# Patient Record
Sex: MALE | Race: WHITE | NOT HISPANIC OR LATINO | Employment: OTHER | URBAN - NONMETROPOLITAN AREA
[De-identification: names, ages, dates, MRNs, and addresses within clinical notes are randomized per-mention and may not be internally consistent; named-entity substitution may affect disease eponyms.]

---

## 2021-02-28 ENCOUNTER — APPOINTMENT (EMERGENCY)
Dept: CT IMAGING | Facility: HOSPITAL | Age: 71
DRG: 066 | End: 2021-02-28
Payer: MEDICARE

## 2021-02-28 ENCOUNTER — HOSPITAL ENCOUNTER (INPATIENT)
Facility: HOSPITAL | Age: 71
LOS: 1 days | Discharge: HOME | DRG: 066 | End: 2021-03-01
Attending: EMERGENCY MEDICINE | Admitting: FAMILY MEDICINE
Payer: MEDICARE

## 2021-02-28 ENCOUNTER — APPOINTMENT (EMERGENCY)
Dept: RADIOLOGY | Facility: HOSPITAL | Age: 71
DRG: 066 | End: 2021-02-28
Payer: MEDICARE

## 2021-02-28 DIAGNOSIS — Z72.0 TOBACCO ABUSE: ICD-10-CM

## 2021-02-28 DIAGNOSIS — I63.9 ACUTE CVA (CEREBROVASCULAR ACCIDENT) (HCC): ICD-10-CM

## 2021-02-28 DIAGNOSIS — I63.9 STROKE (HCC): Primary | ICD-10-CM

## 2021-02-28 DIAGNOSIS — R21 RASH: ICD-10-CM

## 2021-02-28 DIAGNOSIS — N40.0 BPH (BENIGN PROSTATIC HYPERPLASIA): ICD-10-CM

## 2021-02-28 DIAGNOSIS — I10 HTN (HYPERTENSION): ICD-10-CM

## 2021-02-28 DIAGNOSIS — I71.2 ASCENDING AORTIC ANEURYSM (HCC): ICD-10-CM

## 2021-02-28 PROBLEM — I71.21 ASCENDING AORTIC ANEURYSM: Status: ACTIVE | Noted: 2021-02-28

## 2021-02-28 PROBLEM — E03.9 HYPOTHYROIDISM: Status: ACTIVE | Noted: 2021-02-28

## 2021-02-28 LAB
ALBUMIN SERPL BCP-MCNC: 3.9 G/DL (ref 3.5–5)
ALP SERPL-CCNC: 92 U/L (ref 46–116)
ALT SERPL W P-5'-P-CCNC: 43 U/L (ref 12–78)
AMPHETAMINES SERPL QL SCN: NEGATIVE
ANION GAP SERPL CALCULATED.3IONS-SCNC: 9 MMOL/L (ref 4–13)
APTT PPP: 34 SECONDS (ref 23–37)
AST SERPL W P-5'-P-CCNC: 23 U/L (ref 5–45)
ATRIAL RATE: 68 BPM
BACTERIA UR QL AUTO: NORMAL /HPF
BARBITURATES UR QL: NEGATIVE
BENZODIAZ UR QL: NEGATIVE
BILIRUB DIRECT SERPL-MCNC: 0.19 MG/DL (ref 0–0.2)
BILIRUB SERPL-MCNC: 0.93 MG/DL (ref 0.2–1)
BILIRUB UR QL STRIP: NEGATIVE
BUN SERPL-MCNC: 17 MG/DL (ref 5–25)
CALCIUM SERPL-MCNC: 8.4 MG/DL (ref 8.3–10.1)
CHLORIDE SERPL-SCNC: 103 MMOL/L (ref 100–108)
CK SERPL-CCNC: 110 U/L (ref 39–308)
CLARITY UR: CLEAR
CO2 SERPL-SCNC: 28 MMOL/L (ref 21–32)
COCAINE UR QL: NEGATIVE
COLOR UR: YELLOW
CREAT SERPL-MCNC: 1.19 MG/DL (ref 0.6–1.3)
ERYTHROCYTE [DISTWIDTH] IN BLOOD BY AUTOMATED COUNT: 12.5 % (ref 11.6–15.1)
EST. AVERAGE GLUCOSE BLD GHB EST-MCNC: 100 MG/DL
ETHANOL SERPL-MCNC: <3 MG/DL (ref 0–3)
FLUAV RNA RESP QL NAA+PROBE: NEGATIVE
FLUBV RNA RESP QL NAA+PROBE: NEGATIVE
GFR SERPL CREATININE-BSD FRML MDRD: 61 ML/MIN/1.73SQ M
GLUCOSE SERPL-MCNC: 84 MG/DL (ref 65–140)
GLUCOSE SERPL-MCNC: 91 MG/DL (ref 65–140)
GLUCOSE UR STRIP-MCNC: NEGATIVE MG/DL
HBA1C MFR BLD: 5.1 %
HCT VFR BLD AUTO: 48.5 % (ref 36.5–49.3)
HGB BLD-MCNC: 16.8 G/DL (ref 12–17)
HGB UR QL STRIP.AUTO: ABNORMAL
INR PPP: 1.02 (ref 0.84–1.19)
KETONES UR STRIP-MCNC: NEGATIVE MG/DL
LEUKOCYTE ESTERASE UR QL STRIP: NEGATIVE
MCH RBC QN AUTO: 31.6 PG (ref 26.8–34.3)
MCHC RBC AUTO-ENTMCNC: 34.6 G/DL (ref 31.4–37.4)
MCV RBC AUTO: 91 FL (ref 82–98)
METHADONE UR QL: NEGATIVE
NITRITE UR QL STRIP: NEGATIVE
NON-SQ EPI CELLS URNS QL MICRO: NORMAL /HPF
OPIATES UR QL SCN: NEGATIVE
OXYCODONE+OXYMORPHONE UR QL SCN: NEGATIVE
P AXIS: 55 DEGREES
PCP UR QL: NEGATIVE
PH UR STRIP.AUTO: 5 [PH]
PLATELET # BLD AUTO: 316 THOUSANDS/UL (ref 149–390)
PMV BLD AUTO: 9.4 FL (ref 8.9–12.7)
POTASSIUM SERPL-SCNC: 3.8 MMOL/L (ref 3.5–5.3)
PR INTERVAL: 194 MS
PROT SERPL-MCNC: 7.5 G/DL (ref 6.4–8.2)
PROT UR STRIP-MCNC: NEGATIVE MG/DL
PROTHROMBIN TIME: 13.2 SECONDS (ref 11.6–14.5)
QRS AXIS: -49 DEGREES
QRSD INTERVAL: 100 MS
QT INTERVAL: 420 MS
QTC INTERVAL: 446 MS
RBC # BLD AUTO: 5.32 MILLION/UL (ref 3.88–5.62)
RBC #/AREA URNS AUTO: NORMAL /HPF
RSV RNA RESP QL NAA+PROBE: NEGATIVE
SARS-COV-2 RNA RESP QL NAA+PROBE: NEGATIVE
SODIUM SERPL-SCNC: 140 MMOL/L (ref 136–145)
SP GR UR STRIP.AUTO: 1.01 (ref 1–1.03)
T WAVE AXIS: 31 DEGREES
THC UR QL: POSITIVE
TROPONIN I SERPL-MCNC: <0.02 NG/ML
TSH SERPL DL<=0.05 MIU/L-ACNC: 2.59 UIU/ML (ref 0.36–3.74)
TSH SERPL DL<=0.05 MIU/L-ACNC: 2.64 UIU/ML (ref 0.36–3.74)
UROBILINOGEN UR QL STRIP.AUTO: 0.2 E.U./DL
VENTRICULAR RATE: 68 BPM
VIT B12 SERPL-MCNC: 477 PG/ML (ref 100–900)
WBC # BLD AUTO: 10.35 THOUSAND/UL (ref 4.31–10.16)
WBC #/AREA URNS AUTO: NORMAL /HPF

## 2021-02-28 PROCEDURE — 81001 URINALYSIS AUTO W/SCOPE: CPT | Performed by: EMERGENCY MEDICINE

## 2021-02-28 PROCEDURE — 99285 EMERGENCY DEPT VISIT HI MDM: CPT

## 2021-02-28 PROCEDURE — 0241U HB NFCT DS VIR RESP RNA 4 TRGT: CPT | Performed by: EMERGENCY MEDICINE

## 2021-02-28 PROCEDURE — 82077 ASSAY SPEC XCP UR&BREATH IA: CPT | Performed by: EMERGENCY MEDICINE

## 2021-02-28 PROCEDURE — 80076 HEPATIC FUNCTION PANEL: CPT | Performed by: EMERGENCY MEDICINE

## 2021-02-28 PROCEDURE — 84484 ASSAY OF TROPONIN QUANT: CPT | Performed by: EMERGENCY MEDICINE

## 2021-02-28 PROCEDURE — 93005 ELECTROCARDIOGRAM TRACING: CPT

## 2021-02-28 PROCEDURE — G0427 INPT/ED TELECONSULT70: HCPCS | Performed by: PSYCHIATRY & NEUROLOGY

## 2021-02-28 PROCEDURE — 71045 X-RAY EXAM CHEST 1 VIEW: CPT

## 2021-02-28 PROCEDURE — 1124F ACP DISCUSS-NO DSCNMKR DOCD: CPT | Performed by: PSYCHIATRY & NEUROLOGY

## 2021-02-28 PROCEDURE — 85027 COMPLETE CBC AUTOMATED: CPT | Performed by: EMERGENCY MEDICINE

## 2021-02-28 PROCEDURE — 80307 DRUG TEST PRSMV CHEM ANLYZR: CPT | Performed by: EMERGENCY MEDICINE

## 2021-02-28 PROCEDURE — 85610 PROTHROMBIN TIME: CPT | Performed by: EMERGENCY MEDICINE

## 2021-02-28 PROCEDURE — 84443 ASSAY THYROID STIM HORMONE: CPT | Performed by: PSYCHIATRY & NEUROLOGY

## 2021-02-28 PROCEDURE — 80048 BASIC METABOLIC PNL TOTAL CA: CPT | Performed by: EMERGENCY MEDICINE

## 2021-02-28 PROCEDURE — 36415 COLL VENOUS BLD VENIPUNCTURE: CPT | Performed by: EMERGENCY MEDICINE

## 2021-02-28 PROCEDURE — G1004 CDSM NDSC: HCPCS

## 2021-02-28 PROCEDURE — 82948 REAGENT STRIP/BLOOD GLUCOSE: CPT

## 2021-02-28 PROCEDURE — 70496 CT ANGIOGRAPHY HEAD: CPT

## 2021-02-28 PROCEDURE — 84443 ASSAY THYROID STIM HORMONE: CPT | Performed by: EMERGENCY MEDICINE

## 2021-02-28 PROCEDURE — 99223 1ST HOSP IP/OBS HIGH 75: CPT | Performed by: FAMILY MEDICINE

## 2021-02-28 PROCEDURE — 83036 HEMOGLOBIN GLYCOSYLATED A1C: CPT | Performed by: PSYCHIATRY & NEUROLOGY

## 2021-02-28 PROCEDURE — 85730 THROMBOPLASTIN TIME PARTIAL: CPT | Performed by: EMERGENCY MEDICINE

## 2021-02-28 PROCEDURE — 96360 HYDRATION IV INFUSION INIT: CPT

## 2021-02-28 PROCEDURE — 99285 EMERGENCY DEPT VISIT HI MDM: CPT | Performed by: EMERGENCY MEDICINE

## 2021-02-28 PROCEDURE — 82607 VITAMIN B-12: CPT | Performed by: PSYCHIATRY & NEUROLOGY

## 2021-02-28 PROCEDURE — 82550 ASSAY OF CK (CPK): CPT | Performed by: EMERGENCY MEDICINE

## 2021-02-28 PROCEDURE — 70498 CT ANGIOGRAPHY NECK: CPT

## 2021-02-28 RX ORDER — HYDROXYZINE HYDROCHLORIDE 10 MG/1
10 TABLET, FILM COATED ORAL EVERY 6 HOURS PRN
Status: DISCONTINUED | OUTPATIENT
Start: 2021-02-28 | End: 2021-03-01 | Stop reason: HOSPADM

## 2021-02-28 RX ORDER — NICOTINE 21 MG/24HR
14 PATCH, TRANSDERMAL 24 HOURS TRANSDERMAL DAILY
Status: DISCONTINUED | OUTPATIENT
Start: 2021-02-28 | End: 2021-03-01 | Stop reason: HOSPADM

## 2021-02-28 RX ORDER — LEVOTHYROXINE SODIUM 0.1 MG/1
100 TABLET ORAL DAILY
Status: DISCONTINUED | OUTPATIENT
Start: 2021-02-28 | End: 2021-03-01 | Stop reason: HOSPADM

## 2021-02-28 RX ORDER — DIAPER,BRIEF,INFANT-TODD,DISP
EACH MISCELLANEOUS 2 TIMES DAILY
Status: DISCONTINUED | OUTPATIENT
Start: 2021-02-28 | End: 2021-03-01 | Stop reason: HOSPADM

## 2021-02-28 RX ORDER — ATORVASTATIN CALCIUM 40 MG/1
40 TABLET, FILM COATED ORAL EVERY EVENING
Status: DISCONTINUED | OUTPATIENT
Start: 2021-02-28 | End: 2021-03-01 | Stop reason: HOSPADM

## 2021-02-28 RX ORDER — ASPIRIN 81 MG/1
81 TABLET, CHEWABLE ORAL DAILY
Status: DISCONTINUED | OUTPATIENT
Start: 2021-03-01 | End: 2021-03-01 | Stop reason: HOSPADM

## 2021-02-28 RX ORDER — LEVOTHYROXINE SODIUM 0.1 MG/1
TABLET ORAL DAILY
COMMUNITY

## 2021-02-28 RX ORDER — ASPIRIN 81 MG/1
324 TABLET, CHEWABLE ORAL ONCE
Status: COMPLETED | OUTPATIENT
Start: 2021-02-28 | End: 2021-02-28

## 2021-02-28 RX ADMIN — ATORVASTATIN CALCIUM 40 MG: 40 TABLET, FILM COATED ORAL at 17:09

## 2021-02-28 RX ADMIN — Medication 14 MG: at 10:31

## 2021-02-28 RX ADMIN — HYDROXYZINE HYDROCHLORIDE 10 MG: 10 TABLET, FILM COATED ORAL at 22:41

## 2021-02-28 RX ADMIN — HYDROCORTISONE: 1 CREAM TOPICAL at 14:19

## 2021-02-28 RX ADMIN — ASPIRIN 81 MG CHEWABLE TABLET 324 MG: 81 TABLET CHEWABLE at 10:55

## 2021-02-28 RX ADMIN — ENOXAPARIN SODIUM 40 MG: 40 INJECTION SUBCUTANEOUS at 14:19

## 2021-02-28 RX ADMIN — HYDROXYZINE HYDROCHLORIDE 10 MG: 10 TABLET, FILM COATED ORAL at 15:47

## 2021-02-28 RX ADMIN — SODIUM CHLORIDE 1000 ML: 0.9 INJECTION, SOLUTION INTRAVENOUS at 09:33

## 2021-02-28 RX ADMIN — IOHEXOL 85 ML: 350 INJECTION, SOLUTION INTRAVENOUS at 09:36

## 2021-02-28 NOTE — TELEMEDICINE
TeleConsultation - Stroke   Kwaku Ayoub 69 y o  male MRN: [id number]  Unit/Bed#: ED 09 Encounter: [id number]      REQUIRED DOCUMENTATION:     1  This service was provided via Telemedicine  2  Provider located at [facility]  3  TeleMed provider: Jamie Clarke DO   4  Identify all parties in room with patient during tele consult:  Patient, patient's son, nurse  5  After connecting through [employer], patient was identified by name and date of birth and assistant checked wristband  Patient was then informed that this was a Telemedicine visit and that the exam was being conducted confidentially over secure lines  My office door was closed  No one else was in the room  Patient acknowledged consent and understanding of privacy and security of the Telemedicine visit, and gave us permission to have the assistant stay in the room in order to assist with the history and to conduct the exam   I informed the patient that I have reviewed their record in Epic and presented the opportunity for them to ask any questions regarding the visit today  The patient agreed to participate  Assessment/Plan   Assessment/Plan    Subacute stroke  - load aspirin 325 mg once, 81 mg daily  -start Lipitor 40 mg daily, FLP pending  -MRI brain pending   -echocardiogram pending  -CTA was without any clear hemodynamically significant stenosis  - given timeline of symptoms, patient is likely outside the window for any benefit of permissive hypertension  Okay to pursue normotension   - recommend PT OT, SLP eval and treat  - will re-evaluate role for AP/AC after above workup  - please call questions/concerns    TPA Decision:  No tPA given due to outside time window    Kwaku Ayoub will need follow up in at the next regular appointment with neurovascular attending or advance practitioner  He will not require outpatient neurological testing      History of Present Illness     Reason for Consult / Principal Problem:  Stroke  Hx and PE limited by:  Not applicable  Patient last known well:  A few days ago  Stroke alert called:  Called by  regarding stroke at 9:30 a m  Neurology time of arrival:  Neurology response was immediate  HPI: Dario Flanagan is a 69 y o  right handed male with no listed past medical history, but it takes medication for hypothyroid and is a chronic daily smoker who presents with slurred speech and unsteady gait for the past few days  Patient reports general feeling of unwell with some unsteadiness on his feet for the past week  Patient admits to having some slurred speech for the past few days  Patient reports this morning he felt particularly bad (no further description), which prompted him to come to the hospital   - patient admits this some sensory abnormality in the left arm however denies any sensory loss on testing   - also denies any focal motor weakness, change in hearing, vision or word-finding difficulties     - NIHSS 2    CT head and CTA head and neck were unremarkable for any acute contributory pathology  Consult to Neurology  Consult performed by: Ariel Santo DO  Consult ordered by: Eleni Garcia MD          Review of Systems   Constitutional: Negative  HENT: Negative for hearing loss  Eyes: Negative for photophobia and visual disturbance  Respiratory: Negative for wheezing  Cardiovascular: Negative for chest pain and palpitations  Genitourinary: Negative for dysuria and urgency  Neurological: Positive for speech difficulty, weakness and numbness  Negative for dizziness, light-headedness and headaches  All other systems reviewed and are negative  Historical Information   History reviewed  No pertinent past medical history  History reviewed  No pertinent surgical history    Social History   Social History     Substance and Sexual Activity   Alcohol Use Not Currently     Social History     Substance and Sexual Activity   Drug Use Yes    Types: Marijuana E-Cigarette/Vaping    E-Cigarette Use Never User      E-Cigarette/Vaping Substances     Social History     Tobacco Use   Smoking Status Current Every Day Smoker    Types: Cigarettes   Smokeless Tobacco Never Used     Family History: History reviewed  No pertinent family history  Review of previous medical records was  completed  Meds/Allergies   current meds:   Current Facility-Administered Medications   Medication Dose Route Frequency    nicotine (NICODERM CQ) 14 mg/24hr TD 24 hr patch 14 mg  14 mg Transdermal Daily    sodium chloride 0 9 % bolus 1,000 mL  1,000 mL Intravenous Once       No Known Allergies    Objective   Vitals:Blood pressure 152/75, pulse 66, temperature 97 5 °F (36 4 °C), temperature source Temporal, resp  rate (!) 28, weight 95 7 kg (210 lb 15 7 oz), SpO2 97 %  ,There is no height or weight on file to calculate BMI  No intake or output data in the 24 hours ending 02/28/21 1013    Invasive Devices: Invasive Devices     Peripheral Intravenous Line            Peripheral IV 02/28/21 Right Arm less than 1 day                Physical Exam  Vitals signs reviewed  Constitutional:       General: He is not in acute distress  Appearance: Normal appearance  He is well-developed and normal weight  He is not ill-appearing, toxic-appearing or diaphoretic  HENT:      Head: Normocephalic and atraumatic  Eyes:      General:         Right eye: No discharge  Left eye: No discharge  Extraocular Movements: Extraocular movements intact  Conjunctiva/sclera: Conjunctivae normal    Cardiovascular:      Rate and Rhythm: Normal rate  Pulmonary:      Effort: Pulmonary effort is normal  No respiratory distress  Musculoskeletal: Normal range of motion  Neurological:      General: No focal deficit present  Mental Status: He is alert and oriented to person, place, and time  Cranial Nerves: No cranial nerve deficit  Sensory: No sensory deficit        Motor: No weakness  Coordination: Coordination normal    Psychiatric:         Mood and Affect: Mood normal          Behavior: Behavior normal        Neurologic Exam     Mental Status   Oriented to person, place, and time  Awake alert oriented x3  Attention appears normal and intact  Language is fluent, comprehension appears to be intact  No gross evidence of aphasia  Patient is interacting appropriately  Cranial Nerves   Pupils are equal and round  Extraocular muscles intact, gaze conjugate  Facial motor is asymmetric, patient has widening of the right palpebral fissure  Nasolabial fold visualization is obscured by beard however ER staff reports left facial drooping  Shoulder shrug is symmetric  Motor Exam Patient moves all 4 extremities equally without drift  Bulk appears normal     Sensory Exam   Patient acknowledges sensation equally in all 4 extremities     Gait, Coordination, and Reflexes  no gross ataxia in any limb  Finger-to-nose and heel-to-shin were intact and symmetric   Gait was deferred       NIHSS:  1a Level of Consciousness: 0 = Alert   1b  LOC Questions: 0 = Answers both correctly   1c  LOC Commands: 0 = Obeys both correctly   2  Best Gaze: 0 = Normal   3  Visual: 0 = No visual field loss   4  Facial Palsy: 1=Minor paralysis (flattened nasolabial fold, asymmetric on smiling)   5a  Motor Right Arm: 0=No drift, limb holds 90 (or 45) degrees for full 10 seconds   5b  Motor Left Arm: 0=No drift, limb holds 90 (or 45) degrees for full 10 seconds   6a  Motor Right Le=No drift, limb holds 90 (or 45) degrees for full 10 seconds   6b  Motor Left Le=No drift, limb holds 90 (or 45) degrees for full 10 seconds   7  Limb Ataxia:  0=Absent   8  Sensory: 0=Normal; no sensory loss   9  Best Language:  0=No aphasia, normal   10  Dysarthria: 1=Mild to moderate, patient slurs at least some words and at worst, can be understood with some difficulty   11   Extinction and Inattention (formerly Neglect): 0=No abnormality   Total Score: 2     Time NIHSS was completed:  10:00 a m  Modified Azam Score:  0 (No baseline symptoms/disability)    Lab Results: I have personally reviewed pertinent reports      Imaging Studies: I have personally reviewed pertinent films in PACS  EKG, Pathology, and Other Studies: I have personally reviewed pertinent films in PACS  VTE Prophylaxis: Sequential compression device (Venodyne)

## 2021-02-28 NOTE — ED PROVIDER NOTES
History  Chief Complaint   Patient presents with    Slurred Speech     pts states a few days ago    Extremity Weakness     loss of balance     71-year-old male with past medical history of hypothyroidism, not on blood thinners, presents with stroke-like symptoms with uncertain onset  Patient initially states he has had slurred speech with difficulty walking and dizziness for three days  Patient's grandson then arrives and states that patient's symptoms likely started last night and when asked, patient states that his symptoms started yesterday evening around 9pm and worsened significantly this morning  Patient is a poor historian  Patient denies motor or sensory deficit  Patient states he felt confused and not himself yesterday  He is now alert and oriented at his baseline  No recent infections or illnesses  Patient denies fever, chills, diaphoresis, loss of taste or smell, headache, neck stiffness, sore throat, cough, sputum production, nausea, vomiting, chest pain, shortness of breath, back pain, rash, abdominal pain, urinary symptoms, penile discharge, scrotal swelling, lower extremity swelling or pain, diarrhea, bloody stools or constipation  Patient admits to smoking marijuana  Patient denies fall or trauma  Patient denies previous history of stroke or transient ischemic attack  No history of hepatic encephalopathy  Review of medical chart shows no information  No known COVID-19 exposure  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 due to COVID-19 pandemic including bonnet, eye goggles, face mask and gloves          History provided by:  Patient and medical records   used: No    CVA/TIA-like Symptoms  Presenting symptoms: confusion, incoordination, language symptoms, loss of balance and weakness    Presenting symptoms: no change in level of consciousness, no headaches, no focal sensory loss and no visual change    Onset quality:  Unable to specify  Timing:  Unable to specify  Progression:  Worsening  Similar to previous episodes: no    Associated symptoms: dizziness    Associated symptoms: no chest pain, no trouble swallowing, no facial pain, no fall, no fever, no hearing loss, no bladder incontinence, no nausea, no neck pain, no paresthesias, no seizures, no tinnitus, no vertigo and no vomiting        Prior to Admission Medications   Prescriptions Last Dose Informant Patient Reported? Taking?   levothyroxine 100 mcg tablet   Yes Yes   Sig: Take by mouth daily Dose unknown      Facility-Administered Medications: None       History reviewed  No pertinent past medical history  History reviewed  No pertinent surgical history  History reviewed  No pertinent family history  I have reviewed and agree with the history as documented  E-Cigarette/Vaping    E-Cigarette Use Never User      E-Cigarette/Vaping Substances     Social History     Tobacco Use    Smoking status: Current Every Day Smoker     Types: Cigarettes    Smokeless tobacco: Never Used   Substance Use Topics    Alcohol use: Not Currently    Drug use: Yes     Types: Marijuana       Review of Systems   Constitutional: Negative for chills, diaphoresis, fatigue and fever  HENT: Negative for congestion, drooling, facial swelling, hearing loss, nosebleeds, sneezing, tinnitus, trouble swallowing and voice change  Eyes: Negative for photophobia, pain and visual disturbance  Respiratory: Negative for cough, chest tightness, shortness of breath and wheezing  Cardiovascular: Negative for chest pain, palpitations and leg swelling  Gastrointestinal: Negative for abdominal pain, constipation, diarrhea, nausea and vomiting  Genitourinary: Negative for bladder incontinence, decreased urine volume, difficulty urinating, discharge, dysuria, flank pain, frequency, hematuria, penile pain, penile swelling, scrotal swelling, testicular pain and urgency  Musculoskeletal: Positive for gait problem   Negative for arthralgias, back pain, myalgias, neck pain and neck stiffness  Skin: Negative for color change, pallor, rash and wound  Allergic/Immunologic: Negative for immunocompromised state  Neurological: Positive for dizziness, facial asymmetry, speech difficulty, weakness, disturbances in coordination and loss of balance  Negative for vertigo, tremors, seizures, syncope, light-headedness, numbness, headaches and paresthesias  Hematological: Negative for adenopathy  Psychiatric/Behavioral: Positive for confusion  Negative for agitation, hallucinations and suicidal ideas  The patient is not nervous/anxious  Physical Exam  Physical Exam  Vitals signs and nursing note reviewed  Exam conducted with a chaperone present  Constitutional:       General: He is not in acute distress  Appearance: Normal appearance  He is well-developed and normal weight  He is not ill-appearing, toxic-appearing or diaphoretic  HENT:      Head: Normocephalic and atraumatic  Jaw: There is normal jaw occlusion  Right Ear: Hearing, tympanic membrane, ear canal and external ear normal  There is no impacted cerumen  No mastoid tenderness  No hemotympanum  Left Ear: Hearing, tympanic membrane, ear canal and external ear normal  There is no impacted cerumen  No mastoid tenderness  No hemotympanum  Nose: Nose normal       Right Nostril: No epistaxis  Left Nostril: No epistaxis  Right Sinus: No maxillary sinus tenderness or frontal sinus tenderness  Left Sinus: No maxillary sinus tenderness or frontal sinus tenderness  Mouth/Throat:      Lips: Pink  No lesions  Mouth: Mucous membranes are moist  No lacerations or angioedema  Tongue: No lesions  Tongue does not deviate from midline  Palate: No mass and lesions  Pharynx: Oropharynx is clear  Uvula midline  No pharyngeal swelling, oropharyngeal exudate, posterior oropharyngeal erythema or uvula swelling        Tonsils: No tonsillar exudate or tonsillar abscesses  Eyes:      General: Lids are normal  Vision grossly intact  Gaze aligned appropriately  No visual field deficit or scleral icterus  Right eye: No discharge  Left eye: No discharge  Extraocular Movements: Extraocular movements intact  Right eye: No nystagmus  Left eye: No nystagmus  Conjunctiva/sclera: Conjunctivae normal       Right eye: Right conjunctiva is not injected  Left eye: Left conjunctiva is not injected  Pupils: Pupils are equal, round, and reactive to light  Neck:      Musculoskeletal: Full passive range of motion without pain, normal range of motion and neck supple  No neck rigidity, spinous process tenderness or muscular tenderness  Thyroid: No thyroid mass or thyromegaly  Trachea: Trachea and phonation normal    Cardiovascular:      Rate and Rhythm: Normal rate and regular rhythm  Pulses: Normal pulses  Radial pulses are 2+ on the right side and 2+ on the left side  Dorsalis pedis pulses are 2+ on the right side and 2+ on the left side  Heart sounds: Normal heart sounds, S1 normal and S2 normal    Pulmonary:      Effort: Pulmonary effort is normal  No tachypnea, accessory muscle usage, respiratory distress or retractions  Breath sounds: Normal breath sounds and air entry  No stridor or decreased air movement  No decreased breath sounds, wheezing, rhonchi or rales  Chest:      Chest wall: No tenderness  Abdominal:      General: Abdomen is flat  Bowel sounds are normal  There is no distension  Palpations: Abdomen is soft  Abdomen is not rigid  There is no mass  Tenderness: There is no abdominal tenderness  There is no right CVA tenderness, left CVA tenderness, guarding or rebound  Negative signs include Thomas's sign and McBurney's sign  Hernia: No hernia is present     Genitourinary:     Penis: Normal        Scrotum/Testes: Normal    Musculoskeletal: Normal range of motion  General: No swelling, tenderness, deformity or signs of injury  Right lower leg: No edema  Left lower leg: No edema  Lymphadenopathy:      Cervical: No cervical adenopathy  Skin:     General: Skin is warm and dry  Capillary Refill: Capillary refill takes less than 2 seconds  Coloration: Skin is not ashen, cyanotic, jaundiced, mottled, pale or sallow  Findings: No abrasion, abscess, acne, bruising, burn, ecchymosis, erythema, signs of injury, laceration, lesion, petechiae, rash or wound  Rash is not macular or papular  Neurological:      Mental Status: He is alert and oriented to person, place, and time  Mental status is at baseline  He is not disoriented  GCS: GCS eye subscore is 4  GCS verbal subscore is 5  GCS motor subscore is 6  Cranial Nerves: Cranial nerve deficit present  No dysarthria or facial asymmetry  Sensory: Sensation is intact  No sensory deficit  Motor: Weakness present  No tremor, atrophy, abnormal muscle tone or seizure activity  Coordination: Coordination is intact  Coordination normal       Gait: Gait abnormal       Comments: Patient is AAOx4, GCS 15; slurred speech; motor and sensation intact; visual fields intact; left-sided facial droop; no arm drift; NIH 2   Psychiatric:         Attention and Perception: Attention and perception normal  He is attentive  Mood and Affect: Mood and affect normal          Speech: Speech normal          Behavior: Behavior normal  Behavior is cooperative  Thought Content:  Thought content normal          Cognition and Memory: Cognition and memory normal          Judgment: Judgment normal          Vital Signs  ED Triage Vitals   Temperature Pulse Respirations Blood Pressure SpO2   02/28/21 0920 02/28/21 0920 02/28/21 0920 02/28/21 0920 02/28/21 0930   (!) 96 9 °F (36 1 °C) 82 16 135/87 95 %      Temp Source Heart Rate Source Patient Position - Orthostatic VS BP Location FiO2 (%)   02/28/21 0920 02/28/21 0935 02/28/21 0920 -- --   Temporal Monitor Lying        Pain Score       --                  Vitals:    02/28/21 1005 02/28/21 1015 02/28/21 1020 02/28/21 1045   BP: 152/75 146/84  153/86   Pulse: 66 59 60 56   Patient Position - Orthostatic VS:             Visual Acuity  Visual Acuity      Most Recent Value   L Pupil Size (mm)  4   R Pupil Size (mm)  4          ED Medications  Medications   nicotine (NICODERM CQ) 14 mg/24hr TD 24 hr patch 14 mg (14 mg Transdermal Medication Applied 2/28/21 1031)   sodium chloride 0 9 % bolus 1,000 mL (0 mL Intravenous Stopped 2/28/21 1056)   iohexol (OMNIPAQUE) 350 MG/ML injection (SINGLE-DOSE) 85 mL (85 mL Intravenous Given 2/28/21 0936)   aspirin chewable tablet 324 mg (324 mg Oral Given 2/28/21 1055)       Diagnostic Studies  Results Reviewed     Procedure Component Value Units Date/Time    Vitamin B12 [[id number]] Collected: 02/28/21 0930    Lab Status: In process Specimen: Blood from Arm, Left Updated: 02/28/21 1054    Urine Microscopic [[id number]]  (Normal) Collected: 02/28/21 1025    Lab Status: Final result Specimen: Urine, Clean Catch Updated: 02/28/21 1044     RBC, UA 0-1 /hpf      WBC, UA None Seen /hpf      Epithelial Cells None Seen /hpf      Bacteria, UA None Seen /hpf     UA w Reflex to Microscopic w Reflex to Culture [[id number]]  (Abnormal) Collected: 02/28/21 1025    Lab Status: Final result Specimen: Urine, Clean Catch Updated: 02/28/21 1033     Color, UA Yellow     Clarity, UA Clear     Specific Gravity, UA 1 010     pH, UA 5 0     Leukocytes, UA Negative     Nitrite, UA Negative     Protein, UA Negative mg/dl      Glucose, UA Negative mg/dl      Ketones, UA Negative mg/dl      Urobilinogen, UA 0 2 E U /dl      Bilirubin, UA Negative     Blood, UA Trace-lysed    TSH, 3rd generation with Free T4 reflex [[id number]] Collected: 02/28/21 0930    Lab Status:  In process Specimen: Blood from Arm, Left Updated: 02/28/21 1029    Rapid drug screen, urine [[id number]] Collected: 02/28/21 1025    Lab Status: In process Specimen: Urine, Clean Catch Updated: 02/28/21 1029    [facility]OVID[facility]/B[facility] [[id number]  (Normal) Collected: 02/28/21 0929    Lab Status: Final result Specimen: Nares from Nose Updated: 02/28/21 1026     SARS-CoV-2 Negative     INFLUENZA A PCR Negative     INFLUENZA B PCR Negative     RSV PCR Negative    Narrative: This test has been authorized by FDA under an EUA (Emergency Use Assay) for use by authorized laboratories  Clinical caution and judgement should be used with the interpretation of these results with consideration of the clinical impression and other laboratory testing  Testing reported as "Positive" or "Negative" has been proven to be accurate according to standard laboratory validation requirements  All testing is performed with control materials showing appropriate reactivity at standard intervals  Hemoglobin A1C [[id number]] Collected: 02/28/21 0929    Lab Status:  In process Specimen: Blood from Arm, Right Updated: 02/28/21 1023    Basic metabolic panel [[id number]] Collected: 02/28/21 0930    Lab Status: Final result Specimen: Blood from Arm, Left Updated: 02/28/21 1006     Sodium 140 mmol/L      Potassium 3 8 mmol/L      Chloride 103 mmol/L      CO2 28 mmol/L      ANION GAP 9 mmol/L      BUN 17 mg/dL      Creatinine 1 19 mg/dL      Glucose 91 mg/dL      Calcium 8 4 mg/dL      eGFR 61 ml/min/1 73sq m     Narrative:      [facility] guidelines for Chronic Kidney Disease (CKD):     Stage 1 with normal or high GFR (GFR > 90 mL/min/1 73 square meters)    Stage 2 Mild CKD (GFR = 60-89 mL/min/1 73 square meters)    Stage 3A Moderate CKD (GFR = 45-59 mL/min/1 73 square meters)    Stage 3B Moderate CKD (GFR = 30-44 mL/min/1 73 square meters)    Stage 4 Severe CKD (GFR = 15-29 mL/min/1 73 square meters)    Stage 5 End Stage CKD (GFR <15 mL/min/1 73 square meters)  Note: GFR calculation is accurate only with a steady state creatinine    CK (with reflex to MB) [[id number]]  (Normal) Collected: 02/28/21 0930    Lab Status: Final result Specimen: Blood from Arm, Left Updated: 02/28/21 1006     Total  U/L     Ethanol [[id number]]  (Normal) Collected: 02/28/21 0930    Lab Status: Final result Specimen: Blood from Arm, Left Updated: 02/28/21 1006     Ethanol Lvl <3 mg/dL     Hepatic function panel [[id number]]  (Normal) Collected: 02/28/21 0930    Lab Status: Final result Specimen: Blood from Arm, Left Updated: 02/28/21 1006     Total Bilirubin 0 93 mg/dL      Bilirubin, Direct 0 19 mg/dL      Alkaline Phosphatase 92 U/L      AST 23 U/L      ALT 43 U/L      Total Protein 7 5 g/dL      Albumin 3 9 g/dL     TSH [[id number]]  (Normal) Collected: 02/28/21 0930    Lab Status: Final result Specimen: Blood from Arm, Left Updated: 02/28/21 1006     TSH 3RD GENERATON 2 593 uIU/mL     Narrative:      Patients undergoing fluorescein dye angiography may retain small amounts of fluorescein in the body for 48-72 hours post procedure  Samples containing fluorescein can produce falsely depressed TSH values  If the patient had this procedure,a specimen should be resubmitted post fluorescein clearance        Troponin I [[id number]]  (Normal) Collected: 02/28/21 0929    Lab Status: Final result Specimen: Blood from Arm, Right Updated: 02/28/21 0956     Troponin I <0 02 ng/mL     Protime-INR [[id number]]  (Normal) Collected: 02/28/21 0929    Lab Status: Final result Specimen: Blood from Arm, Right Updated: 02/28/21 0948     Protime 13 2 seconds      INR 1 02    APTT [[id number]]  (Normal) Collected: 02/28/21 0929    Lab Status: Final result Specimen: Blood from Arm, Right Updated: 02/28/21 0948     PTT 34 seconds     CBC and Platelet [[id number]]  (Abnormal) Collected: 02/28/21 0929    Lab Status: Final result Specimen: Blood from Arm, Right Updated: 02/28/21 0935     WBC 10 35 Thousand/uL      RBC 5 32 Million/uL Hemoglobin 16 8 g/dL      Hematocrit 48 5 %      MCV 91 fL      MCH 31 6 pg      MCHC 34 6 g/dL      RDW 12 5 %      Platelets 925 Thousands/uL      MPV 9 4 fL     Fingerstick Glucose (POCT) [[id number]]  (Normal) Collected: 02/28/21 0923    Lab Status: Final result Updated: 02/28/21 0925     POC Glucose 84 mg/dl                  X-ray chest 1 view portable   Final Result by Lorena Colon DO (02/28 1014)      No acute cardiopulmonary disease  Workstation performed: [id number]         CTA stroke alert (head/neck)   Final Result by Edis Arguello DO (02/28 1006)   1  Mild atherosclerotic changes involving the cervical internal carotid arteries bilaterally  No flow-limiting stenosis  No evidence of carotid or vertebral artery dissection  2   Mild atherosclerotic changes involving the cavernous and supraclinoid internal carotid arteries bilaterally as well as the intracranial segments of the vertebral arteries bilaterally  No evidence of large vessel central occlusive disease involving    the [place]  3   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  3   No pulmonary parenchymal changes to suggest COVID19 infection  4   4 5 cm ascending aortic aneurysm  No evidence of dissection  If there is continued concern for acute cerebral ischemia, recommend follow-up MRI of the brain with diffusion-weighted sequencing  Findings were directly discussed with Jaime Hinkle on 2/28/2021 9:39 AM                      Workstation performed: [id number]         CT stroke alert brain   Final Result by Edis Arguello DO (02/28 1009)   Cerebral atrophy with chronic small vessel ischemic white matter disease  No acute intracranial abnormality          If there is continued concern for acute cerebral ischemia, recommend follow-up MRI of the brain with diffusion-weighted sequencing, given the degree of chronic small vessel ischemic change  Findings were directly discussed with Philomena Eddy on 2/28/2021 9:39 AM       Workstation performed: [id number]         MRI inpatient order    (Results Pending)              Procedures  ECG 12 Lead Documentation Only    Date/Time: 2/28/2021 9:47 AM  Performed by: Ramya Leon MD  Authorized by: Ramya Leon MD     Indications / Diagnosis:  Stroke  ECG reviewed by me, the ED Provider: yes    Patient location:  ED  Previous ECG:     Comparison to cardiac monitor: Yes    Interpretation:     Interpretation: abnormal    Rate:     ECG rate:  68bpm    ECG rate assessment: normal    Rhythm:     Rhythm: sinus rhythm    Ectopy:     Ectopy: none    QRS:     QRS axis:  Left  Conduction:     Conduction: normal    ST segments:     ST segments:  Normal  T waves:     T waves: flattening and inverted      Flattening:  III, aVL and V1    Inverted:  AVR  Comments:      No STEMI  NE 194ms  QRS 100ms  QT 446ms    Cardiac monitoring ordered  Heart rate and rhythm as above  I have personally read and interpreted the EKG as above  ED Course  ED Course as of Feb 28 1056   Sun Feb 28, 2021   8749 Stroke Alert called  [facility] 2      [facility] with Dr Brigido Duverney, Neurology who requests telestroke to be set up  Informed nurse  [address] [facility] with Dr Campbell Files, Radiology  Imaging is negative  Updated patient  Informed him of telestroke evaluation with Dr Brigido Duverney  Computer is at bedside  Patient does not have a preference of hospital in case he must be transferred out for MRI  Confirmed with Nurse Supervisor that [address] [place] [address] has no MRI today  0578 Dr Brigido Duverney, Neurology has evaluated patient  Patient can received ASA 324mg PO now and be started on ASA 81mg PO daily  He can be admitted to [address] [place] [address] for stroke pathway and get MRI tomorrow        1024 CXR:IMPRESSION:     No acute cardiopulmonary disease                1025 CTH:IMPRESSION:  Cerebral atrophy with chronic small vessel ischemic white matter disease  No acute intracranial abnormality        If there is continued concern for acute cerebral ischemia, recommend follow-up MRI of the brain with diffusion-weighted sequencing, given the degree of chronic small vessel ischemic change               Findings were directly discussed with Maye Friend on 2/28/2021 9:39 AM       1025 CTA:IMPRESSION:  1  Mild atherosclerotic changes involving the cervical internal carotid arteries bilaterally  No flow-limiting stenosis  No evidence of carotid or vertebral artery dissection      2   Mild atherosclerotic changes involving the cavernous and supraclinoid internal carotid arteries bilaterally as well as the intracranial segments of the vertebral arteries bilaterally  No evidence of large vessel central occlusive disease involving   the [place]      3   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      3  No pulmonary parenchymal changes to suggest COVID19 infection      4   4 5 cm ascending aortic aneurysm  No evidence of dissection            If there is continued concern for acute cerebral ischemia, recommend follow-up MRI of the brain with diffusion-weighted sequencing             1025 Dr Alfredo Connors accepts patient to [facility] inpatient for stroke pathway and knows that patient needs MRI tomorrow  Updated patient on plan for admission  His slurred speech and facial droop is improving        [facility] COVID-19 negative                    Stroke Assessment     Row Name 02/28/21 0930             NIH Stroke Scale    Interval  24 hours post-onset of symptoms      Level of Consciousness (1a )  0      LOC Questions (1b )  0      LOC Commands (1c )  0      Best Gaze (2 )  0      Visual (3 )  0      Facial Palsy (4 )  1      Motor Arm, Left (5a )  0      Motor Arm, Right (5b )  0      Motor Leg, Left (6a )  0      Motor Leg, Right (6b )  0      Limb Ataxia (7 )  0      Sensory (8 )  0 Best Language (9 )  0      Dysarthria (10 )  1      Extinction and Inattention (11 ) (Formerly Neglect)  0      Total  2          First Filed Value   TPA Decision  Patient not a TPA candidate  Patient is not a candidate options  Unclear time of onset outside appropriate time window  SBIRT 20yo+      Most Recent Value   SBIRT (22 yo +)   In order to provide better care to our patients, we are screening all of our patients for alcohol and drug use  Would it be okay to ask you these screening questions? Yes Filed at: 02/28/2021 5205   Initial Alcohol Screen: US AUDIT-C    1  How often do you have a drink containing alcohol?  0 Filed at: 02/28/2021 0922   2  How many drinks containing alcohol do you have on a typical day you are drinking? 0 Filed at: 02/28/2021 0922   3a  Male UNDER 65: How often do you have five or more drinks on one occasion? 0 Filed at: 02/28/2021 0922   3b  FEMALE Any Age, or MALE 65+: How often do you have 4 or more drinks on one occassion? 0 Filed at: 02/28/2021 2235   Audit-C Score  0 Filed at: 02/28/2021 8450   JIMMY: How many times in the past year have you    Used an illegal drug or used a prescription medication for non-medical reasons?   Never Filed at: 02/28/2021 2531                    MDM  Number of Diagnoses or Management Options  Ascending aortic aneurysm [facility]):   Stroke [facility]):      Amount and/or Complexity of Data Reviewed  Clinical lab tests: reviewed and ordered  Tests in the radiology section of CPT®: ordered and reviewed  Tests in the medicine section of CPT®: ordered and reviewed  Obtain history from someone other than the patient: yes (Family members)  Review and summarize past medical records: yes  Discuss the patient with other providers: yes (Neurology, Dr Calles Salvage  Radiology, Dr Kacey Salgado, Dr Ayo Whipple)  Independent visualization of images, tracings, or specimens: yes (CTH, CTA, CXR, EKG)    Risk of Complications, Morbidity, and/or Mortality  Presenting problems: high  Diagnostic procedures: moderate  Management options: moderate    Patient Progress  Patient progress: stable      Disposition  Final diagnoses:   Stroke [facility])   Ascending aortic aneurysm ([address] )     Time reflects when diagnosis was documented in both MDM as applicable and the Disposition within this note     Time User Action Codes Description Comment    2/28/2021 10:33 AM Lady Arredondo Add [I63 9] Stroke [facility])     2/28/2021 10:56 AM Lady Arredondo Add [I71 2] Ascending aortic aneurysm [facility])       ED Disposition     ED Disposition Condition Date/Time Comment    Admit Stable Sun Feb 28, 2021 10:33 AM Case was discussed with Dr Ashley Galindo and the patient's admission status was agreed to be Admission Status: inpatient status to the service of Dr Ashley Galindo   Follow-up Information    None         Patient's Medications   Discharge Prescriptions    No medications on file     No discharge procedures on file      PDMP Review     None          ED Provider  Electronically Signed by    MD Renetta Taylor MD  02/28/21 3055

## 2021-02-28 NOTE — ASSESSMENT & PLAN NOTE
· 4 5 cm he is a smoker    Will need outpatient follow-up with Cardiology/Cardiothoracic surgery to keep surveillance

## 2021-02-28 NOTE — ASSESSMENT & PLAN NOTE
· Chest and back possibility of bedbugs his itching mostly at night  Will place on isolation precautions    Place hydrocortisone cream

## 2021-02-28 NOTE — PLAN OF CARE
Problem: Neurological Deficit  Goal: Neurological status is stable or improving  Description: Interventions:  - Monitor and assess patient's level of consciousness, motor function, sensory function, and level of assistance needed for ADLs  - Monitor and report changes from baseline  Collaborate with interdisciplinary team to initiate plan and implement interventions as ordered  - Provide and maintain a safe environment  - Consider seizure precautions  - Consider fall precautions  - Consider aspiration precautions  - Consider bleeding precautions  Outcome: Progressing     Problem: Activity Intolerance/Impaired Mobility  Goal: Mobility/activity is maintained at optimum level for patient  Description: Interventions:  - Assess and monitor patient  barriers to mobility and need for assistive/adaptive devices  - Assess patient's emotional response to limitations  - Collaborate with interdisciplinary team and initiate plans and interventions as ordered  - Encourage independent activity per ability   - Maintain proper body alignment  - Perform active/passive rom as tolerated/ordered  - Plan activities to conserve energy   - Turn patient as appropriate  Outcome: Progressing     Problem: Communication Impairment  Goal: Ability to express needs and understand communication  Description: Assess patient's communication skills and ability to understand information  Patient will demonstrate use of effective communication techniques, alternative methods of communication and understanding even if not able to speak  - Encourage communication and provide alternate methods of communication as needed  - Collaborate with case management/ for discharge needs  - Include patient/family/caregiver in decisions related to communication    Outcome: Progressing     Problem: Nutrition  Goal: Nutrition/Hydration status is improving  Description: Monitor and assess patient's nutrition/hydration status for malnutrition (ex- brittle hair, bruises, dry skin, pale skin and conjunctiva, muscle wasting, smooth red tongue, and disorientation)  Collaborate with interdisciplinary team and initiate plan and interventions as ordered  Monitor patient's weight and dietary intake as ordered or per policy  Utilize nutrition screening tool and intervene per policy  Determine patient's food preferences and provide high-protein, high-caloric foods as appropriate  - Assist patient with eating   - Allow adequate time for meals   - Encourage patient to take dietary supplement as ordered  - Collaborate with clinical nutritionist   - Include patient/family/caregiver in decisions related to nutrition  Outcome: Progressing     Problem: SAFETY ADULT  Goal: Patient will remain free of falls  Description: INTERVENTIONS:  - Assess patient frequently for physical needs  -  Identify cognitive and physical deficits and behaviors that affect risk of falls    -  [place] fall precautions as indicated by assessment   - Educate patient/family on patient safety including physical limitations  - Instruct patient to call for assistance with activity based on assessment  - Modify environment to reduce risk of injury  - Consider OT/PT consult to assist with strengthening/mobility  Outcome: Progressing  Goal: Maintain or return to baseline ADL function  Description: INTERVENTIONS:  -  Assess patient's ability to carry out ADLs; assess patient's baseline for ADL function and identify physical deficits which impact ability to perform ADLs (bathing, care of mouth/teeth, toileting, grooming, dressing, etc )  - Assess/evaluate cause of self-care deficits   - Assess range of motion  - Assess patient's mobility; develop plan if impaired  - Assess patient's need for assistive devices and provide as appropriate  - Encourage maximum independence but intervene and supervise when necessary  - Involve family in performance of ADLs  - Assess for home care needs following discharge   - Consider OT consult to assist with ADL evaluation and planning for discharge  - Provide patient education as appropriate  Outcome: Progressing  Goal: Maintain or return mobility status to optimal level  Description: INTERVENTIONS:  - Assess patient's baseline mobility status (ambulation, transfers, stairs, etc )    - Identify cognitive and physical deficits and behaviors that affect mobility  - Identify mobility aids required to assist with transfers and/or ambulation (gait belt, sit-to-stand, lift, walker, cane, etc )  - [place] fall precautions as indicated by assessment  - Record patient progress and toleration of activity level on Mobility SBAR; progress patient to next Phase/Stage  - Instruct patient to call for assistance with activity based on assessment  - Consider rehabilitation consult to assist with strengthening/weightbearing, etc   Outcome: Progressing     Problem: DISCHARGE PLANNING  Goal: Discharge to home or other facility with appropriate resources  Description: INTERVENTIONS:  - Identify barriers to discharge w/patient and caregiver  - Arrange for needed discharge resources and transportation as appropriate  - Identify discharge learning needs (meds, wound care, etc )  - Arrange for interpretive services to assist at discharge as needed  - Refer to Case Management Department for coordinating discharge planning if the patient needs post-hospital services based on physician/advanced practitioner order or complex needs related to functional status, cognitive ability, or social support system  Outcome: Progressing     Problem: Knowledge Deficit  Goal: Patient/family/caregiver demonstrates understanding of disease process, treatment plan, medications, and discharge instructions  Description: Complete learning assessment and assess knowledge base    Interventions:  - Provide teaching at level of understanding  - Provide teaching via preferred learning methods  Outcome: Progressing

## 2021-02-28 NOTE — ASSESSMENT & PLAN NOTE
· Suspected subacute - symptoms all started 3 days ago and progressed to today - outside of window for TPA  · Appreciate neurology consultation to be added highly stroke  Aspirin 81 mg daily Lipitor 40 mg daily stroke pathway no  evidence of stenosis at the CTA  · Patient does have tobacco abuse longstanding history of  No previous history of hypertension  Secondary to subacute no need for permissive hypertension  · MRI of the brain with a 2D echo with bubble telemetry monitor to catch any evidence of arrhythmia    · PT OT neurology to follow up  · Lipid panel and hemoglobin A1c  · CT brain is negative for any acute intracranial hemorrhage or stroke  · nih 3

## 2021-02-28 NOTE — ASSESSMENT & PLAN NOTE
· Longstanding history stays he smokes about 0 5 packs per day    Will place him on nicotine patch and also discussed smoking cessation

## 2021-02-28 NOTE — H&P
H&P- [place] 1950, 69 y o  male MRN: [id number]    Unit/Bed#: -01 Encounter: [id number]    Primary Care Provider: Ana Maria Montero MD   Date and time admitted to hospital: 2/28/2021  9:23 AM        * Acute CVA (cerebrovascular accident) [facility])  Assessment & Plan  · Suspected subacute - symptoms all started 3 days ago and progressed to today - outside of window for TPA  · Appreciate neurology consultation to be added highly stroke  Aspirin 81 mg daily Lipitor 40 mg daily stroke pathway no  evidence of stenosis at the CTA  · Patient does have tobacco abuse longstanding history of  No previous history of hypertension  Secondary to subacute no need for permissive hypertension  · MRI of the brain with a 2D echo with bubble telemetry monitor to catch any evidence of arrhythmia  · PT OT neurology to follow up  · Lipid panel and hemoglobin A1c  · CT brain is negative for any acute intracranial hemorrhage or stroke  · nih 3    Ascending aortic aneurysm (HCC)  Assessment & Plan  · 4 5 cm he is a smoker  Will need outpatient follow-up with Cardiology/Cardiothoracic surgery to keep surveillance    Rash  Assessment & Plan  · Chest and back possibility of bedbugs his itching mostly at night  Will place on isolation precautions  Place hydrocortisone cream     Tobacco abuse  Assessment & Plan  · Longstanding history stays he smokes about 0 5 packs per day  Will place him on nicotine patch and also discussed smoking cessation    Hypothyroidism  Assessment & Plan  · Resume home dose Synthroid and check a TSH tomorrow        VTE Prophylaxis: Enoxaparin (Lovenox)  / sequential compression device   Code Status:  Full code  POLST: There is no POLST form on file for this patient (pre-hospital)  Discussion with family:  Daughter at bedside    Anticipated Length of Stay:  Patient will be admitted on an Inpatient basis with an anticipated length of stay of  > 2 midnights     Justification for Hospital Stay:  Subacute CVA    Total Time for Visit, including Counseling / Coordination of Care: 1 hour  Greater than 50% of this total time spent on direct patient counseling and coordination of care  Chief Complaint:   Slurry speech imbalance    History of Present Illness:    Allegra Mercedes is a 69 y o  male who presents with initially symptoms started about 3 days ago that he was not feeling himself he had some dizziness lightheaded and also of balance  Last night he started to have some slurred speech and today as well currently the speech sounded better also noticeable is the left-sided facial droop  Denies any double or blurry vision he does not take a daily aspirin he is a smoker of a half a pack per day never had a TIA or history of stroke no history of hypertension or diabetes also smokes marijuana a couple times a week  Denies any URI recently  Only has chronic tingling of bilateral feet but no numbness associated with any extremities or face  No chest pain or shortness of breath no cough no fevers  Also daughter reported that he also stated about a couple of days back that he had his left upper extremity did not feel right was stiff    Review of Systems:    Review of Systems   Constitutional: Negative for chills and fever  HENT: Negative for ear pain and sore throat  Eyes: Negative for pain and visual disturbance  Respiratory: Negative for cough and shortness of breath  Cardiovascular: Negative for chest pain and palpitations  Gastrointestinal: Negative for abdominal pain and vomiting  Genitourinary: Negative for dysuria and hematuria  Musculoskeletal: Negative for arthralgias and back pain  Skin: Negative for color change and rash  Neurological: Positive for dizziness, facial asymmetry and speech difficulty  Negative for seizures and syncope  All other systems reviewed and are negative        Past Medical and Surgical History:     Past Medical History:   Diagnosis Date    Disease of thyroid gland Past Surgical History:   Procedure Laterality Date    APPENDECTOMY      APPENDECTOMY      HAND TENDON SURGERY  2013    ROTATOR CUFF REPAIR Right        Meds/Allergies:    Prior to Admission medications    Medication Sig Start Date End Date Taking? Authorizing Provider   levothyroxine 100 mcg tablet Take by mouth daily Dose unknown   Yes Historical Provider, MD     I have reviewed home medications with patient personally  Allergies: Allergies   Allergen Reactions    Penicillins Hives    Furacin [Nitrofurazone] Rash       Social History:     Marital Status: /Civil Union   Substance Use History:   Social History     Substance and Sexual Activity   Alcohol Use Not Currently    Frequency: Patient refused    Drinks per session: Patient refused    Binge frequency: Never     Social History     Tobacco Use   Smoking Status Current Every Day Smoker    Packs/day: 0 50    Years: 9 00    Pack years: 4 50    Types: Cigarettes    Start date: 2011   Smokeless Tobacco Never Used     Social History     Substance and Sexual Activity   Drug Use Yes    Types: Marijuana    Comment: 2-3 times a week       Family History:    Family History   Problem Relation Age of Onset    Arthritis Mother     Diabetes Father        Physical Exam:     Vitals:   Blood Pressure: 135/83 (02/28/21 1302)  Pulse: 61 (02/28/21 1302)  Temperature: 98 °F (36 7 °C) (02/28/21 1122)  Temp Source: Temporal (02/28/21 0935)  Respirations: 18 (02/28/21 1122)  Weight - Scale: 95 7 kg (210 lb 15 7 oz) (02/28/21 0920)  SpO2: 96 % (02/28/21 1302)    Physical Exam  Vitals signs and nursing note reviewed  Constitutional:       Appearance: He is well-developed  HENT:      Head: Normocephalic and atraumatic  Eyes:      Conjunctiva/sclera: Conjunctivae normal    Neck:      Musculoskeletal: Neck supple  Cardiovascular:      Rate and Rhythm: Normal rate and regular rhythm  Heart sounds: No murmur     Pulmonary:      Effort: Pulmonary effort is normal  No respiratory distress  Breath sounds: Normal breath sounds  Abdominal:      Palpations: Abdomen is soft  Tenderness: There is no abdominal tenderness  Skin:     General: Skin is warm and dry  Neurological:      Mental Status: He is alert and oriented to person, place, and time  Comments: Cranial nerves 2-12 intact he does have a pronounced left has facial droop he also has a decreased left upper extremity strength and he does have a mild drift and no issues with the bilateral lower extremities  DTRs +2             Additional Data:     Lab Results: I have personally reviewed pertinent films in PACS    Results from last 7 days   Lab Units 02/28/21  0929   WBC Thousand/uL 10 35*   HEMOGLOBIN g/dL 16 8   HEMATOCRIT % 48 5   PLATELETS Thousands/uL 316     Results from last 7 days   Lab Units 02/28/21  0930   SODIUM mmol/L 140   POTASSIUM mmol/L 3 8   CHLORIDE mmol/L 103   CO2 mmol/L 28   BUN mg/dL 17   CREATININE mg/dL 1 19   ANION GAP mmol/L 9   CALCIUM mg/dL 8 4   ALBUMIN g/dL 3 9   TOTAL BILIRUBIN mg/dL 0 93   ALK PHOS U/L 92   ALT U/L 43   AST U/L 23   GLUCOSE RANDOM mg/dL 91     Results from last 7 days   Lab Units 02/28/21  0929   INR  1 02     Results from last 7 days   Lab Units 02/28/21  0923   POC GLUCOSE mg/dl 84               Imaging: I have personally reviewed pertinent films in PACS    X-ray chest 1 view portable   Final Result by Bijal Sarmiento DO (02/28 1014)      No acute cardiopulmonary disease  Workstation performed: [id number]         CTA stroke alert (head/neck)   Final Result by Nury Johansen DO (02/28 1006)   1  Mild atherosclerotic changes involving the cervical internal carotid arteries bilaterally  No flow-limiting stenosis  No evidence of carotid or vertebral artery dissection        2   Mild atherosclerotic changes involving the cavernous and supraclinoid internal carotid arteries bilaterally as well as the intracranial segments of the vertebral arteries bilaterally  No evidence of large vessel central occlusive disease involving    the [place]  3   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  3   No pulmonary parenchymal changes to suggest COVID19 infection  4   4 5 cm ascending aortic aneurysm  No evidence of dissection  If there is continued concern for acute cerebral ischemia, recommend follow-up MRI of the brain with diffusion-weighted sequencing  Findings were directly discussed with Faustino Balbuena on 2/28/2021 9:39 AM                      Workstation performed: [id number]         CT stroke alert brain   Final Result by Fern Moore DO (02/28 1009)   Cerebral atrophy with chronic small vessel ischemic white matter disease  No acute intracranial abnormality  If there is continued concern for acute cerebral ischemia, recommend follow-up MRI of the brain with diffusion-weighted sequencing, given the degree of chronic small vessel ischemic change  Findings were directly discussed with Faustino Balbuena on 2/28/2021 9:39 AM       Workstation performed: [id number]         MRI inpatient order    (Results Pending)       EKG, Pathology, and Other Studies Reviewed on Admission:   · EKG: reviewed    Allscripts / Epic Records Reviewed: Yes     ** Please Note: This note has been constructed using a voice recognition system   **

## 2021-02-28 NOTE — ED NOTES
Call placed to family and pt states symptoms 3 days ago   Wife states last night       Vishal Sesay RN  02/28/21 0319

## 2021-03-01 ENCOUNTER — APPOINTMENT (INPATIENT)
Dept: MRI IMAGING | Facility: HOSPITAL | Age: 71
DRG: 066 | End: 2021-03-01
Payer: MEDICARE

## 2021-03-01 ENCOUNTER — APPOINTMENT (INPATIENT)
Dept: NON INVASIVE DIAGNOSTICS | Facility: HOSPITAL | Age: 71
DRG: 066 | End: 2021-03-01
Payer: MEDICARE

## 2021-03-01 VITALS
SYSTOLIC BLOOD PRESSURE: 136 MMHG | DIASTOLIC BLOOD PRESSURE: 99 MMHG | OXYGEN SATURATION: 93 % | RESPIRATION RATE: 18 BRPM | HEART RATE: 68 BPM | TEMPERATURE: 97.5 F | WEIGHT: 210.98 LBS

## 2021-03-01 PROBLEM — I10 HYPERTENSION: Status: ACTIVE | Noted: 2021-03-01

## 2021-03-01 PROBLEM — N40.0 BPH (BENIGN PROSTATIC HYPERPLASIA): Status: ACTIVE | Noted: 2021-03-01

## 2021-03-01 LAB
ANION GAP SERPL CALCULATED.3IONS-SCNC: 8 MMOL/L (ref 4–13)
BASOPHILS # BLD AUTO: 0.08 THOUSANDS/ΜL (ref 0–0.1)
BASOPHILS NFR BLD AUTO: 1 % (ref 0–1)
BUN SERPL-MCNC: 15 MG/DL (ref 5–25)
CALCIUM SERPL-MCNC: 8.4 MG/DL (ref 8.3–10.1)
CHLORIDE SERPL-SCNC: 105 MMOL/L (ref 100–108)
CHOLEST SERPL-MCNC: 182 MG/DL (ref 50–200)
CO2 SERPL-SCNC: 25 MMOL/L (ref 21–32)
CREAT SERPL-MCNC: 1.11 MG/DL (ref 0.6–1.3)
EOSINOPHIL # BLD AUTO: 0.44 THOUSAND/ΜL (ref 0–0.61)
EOSINOPHIL NFR BLD AUTO: 4 % (ref 0–6)
ERYTHROCYTE [DISTWIDTH] IN BLOOD BY AUTOMATED COUNT: 12.4 % (ref 11.6–15.1)
GFR SERPL CREATININE-BSD FRML MDRD: 66 ML/MIN/1.73SQ M
GLUCOSE SERPL-MCNC: 97 MG/DL (ref 65–140)
HCT VFR BLD AUTO: 43.1 % (ref 36.5–49.3)
HDLC SERPL-MCNC: 43 MG/DL
HGB BLD-MCNC: 15 G/DL (ref 12–17)
IMM GRANULOCYTES # BLD AUTO: 0.04 THOUSAND/UL (ref 0–0.2)
IMM GRANULOCYTES NFR BLD AUTO: 0 % (ref 0–2)
LDLC SERPL CALC-MCNC: 124 MG/DL (ref 0–100)
LYMPHOCYTES # BLD AUTO: 2.22 THOUSANDS/ΜL (ref 0.6–4.47)
LYMPHOCYTES NFR BLD AUTO: 22 % (ref 14–44)
MCH RBC QN AUTO: 31.9 PG (ref 26.8–34.3)
MCHC RBC AUTO-ENTMCNC: 34.8 G/DL (ref 31.4–37.4)
MCV RBC AUTO: 92 FL (ref 82–98)
MONOCYTES # BLD AUTO: 1.22 THOUSAND/ΜL (ref 0.17–1.22)
MONOCYTES NFR BLD AUTO: 12 % (ref 4–12)
NEUTROPHILS # BLD AUTO: 6.04 THOUSANDS/ΜL (ref 1.85–7.62)
NEUTS SEG NFR BLD AUTO: 61 % (ref 43–75)
NRBC BLD AUTO-RTO: 0 /100 WBCS
PLATELET # BLD AUTO: 284 THOUSANDS/UL (ref 149–390)
PMV BLD AUTO: 9.6 FL (ref 8.9–12.7)
POTASSIUM SERPL-SCNC: 4 MMOL/L (ref 3.5–5.3)
RBC # BLD AUTO: 4.7 MILLION/UL (ref 3.88–5.62)
SODIUM SERPL-SCNC: 138 MMOL/L (ref 136–145)
TRIGL SERPL-MCNC: 73 MG/DL
TSH SERPL DL<=0.05 MIU/L-ACNC: 2.24 UIU/ML (ref 0.36–3.74)
WBC # BLD AUTO: 10.04 THOUSAND/UL (ref 4.31–10.16)

## 2021-03-01 PROCEDURE — 93306 TTE W/DOPPLER COMPLETE: CPT

## 2021-03-01 PROCEDURE — G1004 CDSM NDSC: HCPCS

## 2021-03-01 PROCEDURE — 70551 MRI BRAIN STEM W/O DYE: CPT

## 2021-03-01 PROCEDURE — 93306 TTE W/DOPPLER COMPLETE: CPT | Performed by: INTERNAL MEDICINE

## 2021-03-01 PROCEDURE — 92522 EVALUATE SPEECH PRODUCTION: CPT

## 2021-03-01 PROCEDURE — 97167 OT EVAL HIGH COMPLEX 60 MIN: CPT

## 2021-03-01 PROCEDURE — 99239 HOSP IP/OBS DSCHRG MGMT >30: CPT | Performed by: FAMILY MEDICINE

## 2021-03-01 PROCEDURE — 97163 PT EVAL HIGH COMPLEX 45 MIN: CPT

## 2021-03-01 PROCEDURE — 97116 GAIT TRAINING THERAPY: CPT

## 2021-03-01 PROCEDURE — 84443 ASSAY THYROID STIM HORMONE: CPT | Performed by: FAMILY MEDICINE

## 2021-03-01 PROCEDURE — 80048 BASIC METABOLIC PNL TOTAL CA: CPT | Performed by: FAMILY MEDICINE

## 2021-03-01 PROCEDURE — 85025 COMPLETE CBC W/AUTO DIFF WBC: CPT | Performed by: FAMILY MEDICINE

## 2021-03-01 PROCEDURE — 80061 LIPID PANEL: CPT | Performed by: FAMILY MEDICINE

## 2021-03-01 RX ORDER — ATORVASTATIN CALCIUM 40 MG/1
40 TABLET, FILM COATED ORAL EVERY EVENING
Qty: 30 TABLET | Refills: 0 | Status: SHIPPED | OUTPATIENT
Start: 2021-03-01

## 2021-03-01 RX ORDER — AMLODIPINE BESYLATE 5 MG/1
5 TABLET ORAL DAILY
Status: DISCONTINUED | OUTPATIENT
Start: 2021-03-01 | End: 2021-03-01 | Stop reason: HOSPADM

## 2021-03-01 RX ORDER — AMLODIPINE BESYLATE 5 MG/1
5 TABLET ORAL DAILY
Qty: 30 TABLET | Refills: 0 | Status: SHIPPED | OUTPATIENT
Start: 2021-03-02

## 2021-03-01 RX ORDER — ASPIRIN 81 MG/1
81 TABLET, CHEWABLE ORAL DAILY
Qty: 30 TABLET | Refills: 0 | Status: SHIPPED | OUTPATIENT
Start: 2021-03-02

## 2021-03-01 RX ORDER — CLOPIDOGREL BISULFATE 75 MG/1
300 TABLET ORAL ONCE
Status: COMPLETED | OUTPATIENT
Start: 2021-03-01 | End: 2021-03-01

## 2021-03-01 RX ORDER — HYDROXYZINE HYDROCHLORIDE 10 MG/1
10 TABLET, FILM COATED ORAL EVERY 6 HOURS PRN
Qty: 30 TABLET | Refills: 0 | Status: SHIPPED | OUTPATIENT
Start: 2021-03-01

## 2021-03-01 RX ORDER — CLOPIDOGREL BISULFATE 75 MG/1
75 TABLET ORAL DAILY
Qty: 21 TABLET | Refills: 0 | Status: SHIPPED | OUTPATIENT
Start: 2021-03-02

## 2021-03-01 RX ORDER — NICOTINE 21 MG/24HR
1 PATCH, TRANSDERMAL 24 HOURS TRANSDERMAL DAILY
Qty: 28 PATCH | Refills: 0 | Status: SHIPPED | OUTPATIENT
Start: 2021-03-02

## 2021-03-01 RX ORDER — DIAPER,BRIEF,INFANT-TODD,DISP
EACH MISCELLANEOUS 2 TIMES DAILY
Qty: 30 G | Refills: 0 | Status: SHIPPED | OUTPATIENT
Start: 2021-03-01

## 2021-03-01 RX ORDER — TAMSULOSIN HYDROCHLORIDE 0.4 MG/1
0.4 CAPSULE ORAL
Qty: 30 CAPSULE | Refills: 0 | Status: SHIPPED | OUTPATIENT
Start: 2021-03-01 | End: 2021-08-09 | Stop reason: SDUPTHER

## 2021-03-01 RX ADMIN — ENOXAPARIN SODIUM 40 MG: 40 INJECTION SUBCUTANEOUS at 14:06

## 2021-03-01 RX ADMIN — Medication 14 MG: at 09:09

## 2021-03-01 RX ADMIN — ASPIRIN 81 MG: 81 TABLET, CHEWABLE ORAL at 09:09

## 2021-03-01 RX ADMIN — LEVOTHYROXINE SODIUM 100 MCG: 100 TABLET ORAL at 09:09

## 2021-03-01 RX ADMIN — CLOPIDOGREL BISULFATE 300 MG: 75 TABLET ORAL at 14:06

## 2021-03-01 RX ADMIN — AMLODIPINE BESYLATE 5 MG: 5 TABLET ORAL at 14:06

## 2021-03-01 NOTE — ASSESSMENT & PLAN NOTE
· Suspected subacute - symptoms all started 3 days ago and progressed to today - outside of window for TPA  · Appreciate neurology consultation to be added highly stroke  Aspirin 81 mg daily Lipitor 40 mg daily stroke pathway no  evidence of stenosis at the CTA  Mri brain -   · 1  Acute to subacute infarct involving posterior limb of right internal capsule  No significant edema or mass effect  No hemorrhagic transformation  · Patient does have tobacco abuse longstanding history of  No previous history of hypertension  Secondary to subacute no need for permissive hypertension  · Telemetry no arrhythmia  · PT OT neurology to follow up recommendation for outpatient therapy which I have provided  · Lipid panel and hemoglobin A1c lipid panel LDL sub optimal above 100 continue Lipitor 40 mg daily hemoglobin A1c is controlled 5 1  · CT brain is negative for any acute intracranial hemorrhage or stroke  · nih 3  · Also new onset hypertension a gave him Norvasc 5 mg daily improved will continue that as an outpatient  Goal BP is less than 130/80  · In terms of antiplatelets he will be on aspirin 81 mg daily I have loaded him with Plavix 300 mg p o  X1 today and then starting 75 mg daily from tomorrow dual antiplatelets will be for 21 days and then he is going to be on aspirin alone I did provide outpatient referral for Neurology

## 2021-03-01 NOTE — SOCIAL WORK
CM made follow up appointment for pt with his PCP, Dr Molly Faustin on 3/9/21 at 21 [phone]    AVS updated

## 2021-03-01 NOTE — PLAN OF CARE
Problem: OCCUPATIONAL THERAPY ADULT  Goal: Performs self-care activities at highest level of function for planned discharge setting  See evaluation for individualized goals  Description: Treatment Interventions: ADL retraining, Functional transfer training, Endurance training, UE strengthening/ROM, Cognitive reorientation, Patient/family training, Equipment evaluation/education, Neuromuscular reeducation, Compensatory technique education, Continued evaluation, Energy conservation, Activityengagement          See flowsheet documentation for full assessment, interventions and recommendations  Note: Limitation: Decreased ADL status, Decreased UE strength, Decreased Safe judgement during ADL, Decreased cognition, Decreased endurance, Decreased self-care trans, Decreased high-level ADLs  Prognosis: Good  Assessment: Pt is a 68 y/o M admitted to [address] [place] [address] 2/28/2021 d/t experiencing dizziness, decreased balance, slurred speech and facial droop  Dx: acute CVA  Pt with PMHx impacting performance during functional tasks including: Ascending aortic aneurysm, hypothyroidism  Pt reports living at home with his wife in a 1 story mobile home with 6 DEE B HR  Pt reports completing ADLs, IADLs, functional ambulation and community mobility + driving @ I  On evaluation, Pt A&Ox4  Pt completing bed mobility @ S  UB dressing @ S after se-tup  LB Dressing @ Steadying Assist  Pt completing STS @ SBA  SPT with no AD @ Steadying Assist  Pt completing toileting tasks @ Steadying Assist and standing at sinkside while completing hand hygiene @ SBA  Pt with decreased balance noted and decreased safety awareness, impulsive at times  Pt BUE ROm and MS WFL   Pt present with decrease activity tolerance, decrease standing balance, decrease performance during ADL tasks, decrease cognition, decrease safety awareness , increase impulsiveness, decrease UB MS, decrease generalized strength, decrease activity engagement and decrease performance during functional transfers  Pt would benefit from continued acute OT services to address deficits although is not expected to require any further OT needs upon d/c from [address] [place] [address]     Recommendation: (no OT needs)  OT Discharge Recommendation: Return to previous environment with social support

## 2021-03-01 NOTE — QUICK NOTE
Brief follow-up note:     Nyasia Moore is a 48-year-old male who presents with slurred speech and altered gait  Pertinent data:  -reviewed his MRI brain which reveals a right internal capsule stroke as well as evidence of microvascular disease  -total cholesterol is 182 with LDL cholesterol 124   -hemoglobin A1c is 5 1%  -echocardiogram done but result is currently pending   -reported residual NIH stroke scale is 3    Based on my review of the MRI and the available evidence would suggest that this stroke most likely represents microvascular disease  Recommendations:  -at this point no need for additional permissive hypertension, would suggest controlling blood pressure with goal of blood pressure less than 130/80     -continue atorvastatin 40 mg   -continue aspirin 81 mg   -would load with Plavix 300 mg p o  x1 and then 75 mg daily    He should remain on dual anti-platelet therapy with aspirin and Plavix for a total of 3 weeks only at which time Plavix should be discontinued and he should remain on aspirin monotherapy   -follow up on final result of echocardiogram   -PT/OT/SP    Outpatient neurology follow-up has been requested however we remain available to speak directly with the patient or to provide additional information at the discretion of the primary team

## 2021-03-01 NOTE — SPEECH THERAPY NOTE
Speech/Language Evaluation      Patient Name: Dat Arguello    Today's Date: roblem List  Principal Problem:    Acute CVA (cerebrovascular accident) [facility])  Active Problems:    Hypothyroidism    Tobacco abuse    Rash    Ascending aortic aneurysm [facility])      Past Medical History  Past Medical History:   Diagnosis Date    Disease of thyroid gland        Past Surgical History  Past Surgical History:   Procedure Laterality Date    APPENDECTOMY      APPENDECTOMY      HAND TENDON SURGERY  2013    ROTATOR CUFF REPAIR Right          Summary   Pt presents with mild dysarthria characterized by reduced articulatory precision, limited oral opening on Lt, and rapid rate of production  With the following symptoms, pt's intelligibility of speech remained @ %  Suspect a slight receptive language deficit via word finding difficulty per pt and family report  No instances present during evaluation  Family states avg 1 instance every 2-3 conversations  Cognition appeared intact and functional   Dysphagia screen completed and recommended soft solids with use of moistening agents to aid in breakdown  Pt reports intermittent globus sensation, prior to CVA, and was secondary to breakdown inabilities from poor dentition  Family is planning on pt seeing a dentist shortly following discharge for removal of minimal/poor quality upper/lower dentition and receive denture set  Recommendation:  OP speech/language evaluation to determine if services warranted  Suspect mild dysarthria and word finding to resolve as family/pt reporting a significant reduction in past day however recommended at least an evaluation to r/o need s/p discharge  Pt and family are receptive  Therapy Prognosis: Good  Treatment Recommended/Frequency: Evaluation only @ this time as pt is being discharged from facility this date around 5:00 following results from pending echo        Patient's goal: "I am leaving to go home today "      Current Medical: Rashida Centeno is a 69 y o  male who presents with initially symptoms started about 3 days ago that he was not feeling himself he had some dizziness lightheaded and also of balance  Last night he started to have some slurred speech and today as well currently the speech sounded better also noticeable is the left-sided facial droop  Denies any double or blurry vision he does not take a daily aspirin he is a smoker of a half a pack per day never had a TIA or history of stroke no history of hypertension or diabetes also smokes marijuana a couple times a week  Denies any URI recently  Only has chronic tingling of bilateral feet but no numbness associated with any extremities or face  No chest pain or shortness of breath no cough no fevers  Also daughter reported that he also stated about a couple of days back that he had his left upper extremity did not feel right was stiff    General Cognitive Status:  Alert with adequate attention    Auditory Comprehension:  Body part ID:   Left vs Right Body part: Y  One step commands: 2/2  Two step commands:2/2  Complex or 3 step commands:n/a  Picture ID:   Letter ID:   Personal y/n ?'s:   Simple y/n ?'s:  Complex y/n ?'s:2/2  Paragraph Level Comprehension: n/a  Comprehension of Conversation: n/a    Reading Comprehension:  Simple direction followin/5  Sentence comprehension: intact  Functional comprehension: intact  Paragraph comprehension:n/a      Speech Mechanism Examination:   Facial: left facial droop  Labial: decreased ROM left side  Lingual: left sided tongue deviation  Velum: symmetrical  Mandible:  decreased ROM  Dentition: limited/poor upper/lower dentition  quality poor    Vocal quality:slightly slurred and reduced volume       Oral Expression:  Auto Sequences:   Count Forwards: intact 1-10  Count Backwards: intact 10-1  Days: intact   Months intact  Word Repetition: intact  Phrase Completion: intact  Confrontation Namin/5  Responsive Naming: 4/4  Picture Description: cookie theft image yielded slightly slurred/imprecise speech with reduced volume  Conversation: 95% intelligibility  No instances of word finding  Able to make basic needs known: Yes      Motor Speech:  Dysarthria:   Imprecise artic: reduced oral opening and movement on left causing garbled speech   Rate: slightly rapid   Nasality: adequate   Breath support: functional   Volume: slightly reduced    Orientation:  Person: Y  Place: [facility]: Y  City:Y  Time of Day: Y  Month:Y  JUSTIN:Y  Year: Y  Reason for hospitalization:Y    Cognitive -linguistic skills:  Appeared intact      Results discussed with: wife, pt, daughter, and nephew  All present for evaluation      [address] No[address] Rodriguez, CCC-SLP

## 2021-03-01 NOTE — PHYSICAL THERAPY NOTE
PHYSICAL THERAPY EVALUATION  NAME:  Josef Best  DATE: 03/01/21    AGE:   69 y o  Mrn:   [id number]  ADMIT DX:  Slurred speech [R47 81]  Stroke [facility]) [I63 9]  Ascending aortic aneurysm ([address] ) [I71 2]    Past Medical History:   Diagnosis Date    Disease of thyroid gland      Length Of Stay: 1  Performed at least 2 patient identifiers during session: Name and Birthday  PHYSICAL THERAPY EVALUATION :      03/01/21 0730   PT Last Visit   PT Visit Date 03/01/21   Note Type   Note type Evaluation   Pain Assessment   Pain Assessment Tool 0-10   Pain Score No Pain   Home Living   Type of Home Mobile home  (6 DEE B HRs)   Home Layout One level   Bathroom Shower/Tub Walk-in shower   Bathroom Toilet Standard   Additional Comments Reports living in a mobile home with 6 DEE with B HRs  Reports not having any AD at home  Prior Function   Level of [place] Independent with ADLs and functional mobility   Lives With Spouse  Celi Brooks)   Receives Help From Family   ADL Assistance Independent   IADLs Independent  (+ driving)   Falls in the last 6 months 0   Comments Reports being independent PTA without AD  independent with ADLs and IADLs  Restrictions/Precautions   Other Precautions Chair Alarm; Fall Risk;Bed Alarm; Impulsive   Cognition   Orientation Level Oriented X4   RLE Assessment   RLE Assessment WFL  (4/5)   LLE Assessment   LLE Assessment WFL  (4/5)   Coordination   Movements are Fluid and Coordinated 1  (alt toe tapping and heel to shin)   Light Touch   RLE Light Touch Impaired   RLE Light Touch Comments decreased B feet distally > proximally   LLE Light Touch Impaired   LLE Light Touch Comments decreased B feet distally > proximally   Bed Mobility   Supine to Sit 5  Supervision   Additional items Increased time required;Verbal cues   Additional Comments HOB flat without bedrails  increased time to complete   supervision for safety   Transfers   Sit to Stand   (stand by assistance)   Additional items Assist x 1;Verbal cues; Impulsive   Stand to Sit   (stand by assistance)   Additional items Assist x 1; Impulsive   Stand pivot   (steadying assistance)   Additional items Assist x 1; Impulsive;Verbal cues   Additional Comments no AD  sit<>stand without AD with stand by assistance  pt impulsive to complete mobility  spt without AD with steadying assistance for balance and safety  Ambulation/Elevation   Gait pattern Narrow CORTEZ; Decreased foot clearance; Excessively slow; Short stride; Shuffling   Gait Assistance   (steadying assistance)   Additional items Assist x 1;Verbal cues   Assistive Device None   Distance 24'x1 without AD with steadying assistance with increased path deviation to left requiring stepping strategty and steadying assistance to recover  min cues for increased foot clearance  Balance   Static Sitting Good   Dynamic Sitting Fair +   Static Standing Fair   Dynamic Standing Fair -   Ambulatory Fair -   Activity Tolerance   Activity Tolerance Patient tolerated treatment well   Medical Staff Made Aware Vickie SANDOVAL   Nurse Made Aware Marilu LEUNG   Assessment   Prognosis Good   Problem List Decreased strength;Decreased endurance; Impaired balance;Decreased mobility; Impaired judgement;Decreased safety awareness   Barriers to Discharge None   Goals   Patient Goals "Go home"   STG Expiration Date 03/11/21   PT Treatment Day 1   Plan   Treatment/Interventions Functional transfer training;LE strengthening/ROM; Elevations; Therapeutic exercise; Endurance training;Patient/family training;Equipment eval/education; Bed mobility;Gait training; Compensatory technique education;Spoke to nursing;OT   PT Frequency Other (Comment)  (3-5x/week)   Recommendation   PT Discharge Recommendation Return to previous environment with social support; Other (Comment)  (OPPT services)   Equipment Recommended   (TBD)   PT - OK to Discharge   (when medically cleared with inc support from spouse)   Additional Comments pt sitting in recliner chair at end of session with all needs mary gerardo and posey alarm on   AM-PAC Basic Mobility Inpatient   Turning in Bed Without Bedrails 4   Lying on Back to Sitting on Edge of Flat Bed 3   Moving Bed to Chair 3   Standing Up From Chair 3   Walk in Room 3   Climb 3-5 Stairs 3   Basic Mobility Inpatient Raw Score 19   Basic Mobility Standardized Score 42 48     (Please find full objective findings from PT assessment regarding body systems outlined above)  Assessment: Pt is a 69 y o  male seen for PT evaluation s/p admission to [address] [place] [address] on 2/28/2021 with Acute CVA (cerebrovascular accident) [facility])  Order placed for PT services  Upon evaluation: Pt is presenting with impaired functional mobility due to decreased strength, decreased endurance, impaired balance, gait deviations, decreased safety awareness, impaired judgment and fall risk requiring supervision assistance for bed mobility, steadying to stand by assistance for transfers and steadying assistance for ambulation with out AD  Pt's clinical presentation is currently unstable/unpredictable given the functional mobility deficits above, especially weakness, decreased endurance, gait deviations, decreased activity tolerance, decreased functional mobility tolerance, decreased safety awareness and impaired judgement, coupled with fall risks as indicated by AM-PAC 6-Clicks: 00/16 as well as impulsivity, impaired balance, polypharmacy, impaired judgement, decreased safety awareness and limited sensation/neuropathy and combined with medical complications of abnormal WBCs, impulsivity during admission and need for input for mobility technique/safety  Pt's PMHx and comorbidities that may affect physical performance and progress include: hypothyroidism   Personal factors affecting pt at time of IE include: step(s) to enter environment, inability to perform IADLs, inability to perform ADLs, inability to navigate level surfaces without external assistance, inability to navigate community distances, limited insight into impairments and tobacco use  Pt will benefit from continued skilled PT services to address deficits as defined above and to maximize level of functional mobility to facilitate return toward PLOF and improved QOL  From PT/mobility standpoint, recommendation at time of d/c would be OP PT and home with family support pending progress in order to reduce fall risk and maximize pt's functional independence and consistency with mobility in order to facilitate return to PLOF  Recommend trial with cane next 1-2 sessions and ther ex next 1-2 sessions  The patient's AM-PAC Basic Mobility Inpatient Short Form Raw Score is 19, Standardized Score is 42 48  A standardized score less than 42 9 suggests the patient may benefit from discharge to post-acute rehabilitation services  Please also refer to the recommendation of the Physical Therapist for safe discharge planning  However, anticipate patient being able to return to home with support from spouse  Goals: Pt will: Perform bed mobility tasks with modified I to reposition in bed and prepare for transfers  Pt will perform transfers with modified I to increase Indep in home environment, decrease burden of care, decrease risk for falls and improve activity tolerance and prepare for ambulation  Pt will ambulate with LRAD for >/= 250' with  modified I  to increase Indep in home environment, decrease risk for falls, improve activity tolerance and improve gait quality and to access home environment  Pt will complete >/= 12 steps with with bilateral handrails with modified I to return to home with DEE  Pt will increase B LE strength >/= 1/2 MMT grade to facilitate functional mobility  Pt will demonstrate decreased fall risk as indicated by score of >/= 10/12 on 4 item DGI        Car Elliott, PT,DPT     PHYSICAL THERAPY TREATMENT NOTE  Time UF:1926  Time Out:0750  Total Time: 8'      S:  "I had outpatient therapy for my hands before "  O:  Sit<>stand with stand by assistance without AD and with spc  Dynamic standing balance with steadying assistance for balance and safety when reaching anteriorly outside CORTEZ  Ambulated 160'x1, 170'x1 without AD and then 100'x1 with spc with steadying to stand by assistance at best  Brisk tyler, increased path deviation to left  Decreased step length and foot clearance  With spc, poor sequencing, improper use despite verbal cues  Brisk tyler  Cues for increased step length and foot clearance  4 Item Dynamic Gait Index without AD  2/3 Gait level surface  2/3 Change in gait speed  1/3 Gait with horizontal head turns  1/3 Gait with vertical head turns  6/12 total score (less than 10/12 indicates increased risk of falls in elderly)    Completed 5 steps with B Hrs with supervision, reciprocal pattern  A:  Pt requires verbal cues and tactile cues for safety with mobility and balance  He is impulsive with mobility  He is at an increased fall risk as indicated by score of 6/12 on 4 item DGI  He ambulates with decreased safety with use of spc compared to no AD as he is too impulsive for proper use of spc  He will continue to benefit from PT services to increase mobility  Recommend increased support from spouse at home and outpatient PT services upon D/C   P:  Recommend high level balance training, gait training with LRAD, stair climbing       Sydnie Sprain, PT, DPT

## 2021-03-01 NOTE — PLAN OF CARE
Problem: PHYSICAL THERAPY ADULT  Goal: Performs mobility at highest level of function for planned discharge setting  See evaluation for individualized goals  Description: Treatment/Interventions: Functional transfer training, LE strengthening/ROM, Elevations, Therapeutic exercise, Endurance training, Patient/family training, Equipment eval/education, Bed mobility, Gait training, Compensatory technique education, Spoke to nursing, OT  Equipment Recommended: (TBD)       See flowsheet documentation for full assessment, interventions and recommendations  Note: Prognosis: Good  Problem List: Decreased strength, Decreased endurance, Impaired balance, Decreased mobility, Impaired judgement, Decreased safety awareness  Assessment: Pt is a 69 y o  male seen for PT evaluation s/p admission to [address] [place] [address] on 2/28/2021 with Acute CVA (cerebrovascular accident) [facility])  Order placed for PT services  Upon evaluation: Pt is presenting with impaired functional mobility due to decreased strength, decreased endurance, impaired balance, gait deviations, decreased safety awareness, impaired judgment and fall risk requiring supervision assistance for bed mobility, steadying to stand by assistance for transfers and steadying assistance for ambulation with out AD  Pt's clinical presentation is currently unstable/unpredictable given the functional mobility deficits above, especially weakness, decreased endurance, gait deviations, decreased activity tolerance, decreased functional mobility tolerance, decreased safety awareness and impaired judgement, coupled with fall risks as indicated by AM-PAC 6-Clicks: 67/55 as well as impulsivity, impaired balance, polypharmacy, impaired judgement, decreased safety awareness and limited sensation/neuropathy and combined with medical complications of abnormal WBCs, impulsivity during admission and need for input for mobility technique/safety    Pt's PMHx and comorbidities that may affect physical performance and progress include: hypothyroidism  Personal factors affecting pt at time of IE include: step(s) to enter environment, inability to perform IADLs, inability to perform ADLs, inability to navigate level surfaces without external assistance, inability to navigate community distances, limited insight into impairments and tobacco use  Pt will benefit from continued skilled PT services to address deficits as defined above and to maximize level of functional mobility to facilitate return toward PLOF and improved QOL  From PT/mobility standpoint, recommendation at time of d/c would be OP PT and home with family support pending progress in order to reduce fall risk and maximize pt's functional independence and consistency with mobility in order to facilitate return to PLOF  Recommend trial with cane next 1-2 sessions and ther ex next 1-2 sessions  Barriers to Discharge: None     PT Discharge Recommendation: Return to previous environment with social support, Other (Comment)(OPPT services)     PT - OK to Discharge: (when medically cleared with inc support from spouse)    See flowsheet documentation for full assessment

## 2021-03-01 NOTE — ASSESSMENT & PLAN NOTE
· 4 5 cm he is a smoker  Will need outpatient follow-up with Cardiology/Cardiothoracic surgery to keep surveillance made referral for outpatient cardiology so he could keep surveillance on that  Also control blood pressure I started Norvasc 5 mg daily and blood pressure improved

## 2021-03-01 NOTE — PLAN OF CARE
Problem: Neurological Deficit  Goal: Neurological status is stable or improving  Description: Interventions:  - Monitor and assess patient's level of consciousness, motor function, sensory function, and level of assistance needed for ADLs  - Monitor and report changes from baseline  Collaborate with interdisciplinary team to initiate plan and implement interventions as ordered  - Provide and maintain a safe environment  - Consider seizure precautions  - Consider fall precautions  - Consider aspiration precautions  - Consider bleeding precautions  Outcome: Progressing     Problem: Activity Intolerance/Impaired Mobility  Goal: Mobility/activity is maintained at optimum level for patient  Description: Interventions:  - Assess and monitor patient  barriers to mobility and need for assistive/adaptive devices  - Assess patient's emotional response to limitations  - Collaborate with interdisciplinary team and initiate plans and interventions as ordered  - Encourage independent activity per ability   - Maintain proper body alignment  - Perform active/passive rom as tolerated/ordered  - Plan activities to conserve energy   - Turn patient as appropriate  Outcome: Progressing     Problem: Communication Impairment  Goal: Ability to express needs and understand communication  Description: Assess patient's communication skills and ability to understand information  Patient will demonstrate use of effective communication techniques, alternative methods of communication and understanding even if not able to speak  - Encourage communication and provide alternate methods of communication as needed  - Collaborate with case management/ for discharge needs  - Include patient/family/caregiver in decisions related to communication    Outcome: Progressing     Problem: Potential for Aspiration  Goal: Non-ventilated patient's risk of aspiration is minimized  Description: Assess and monitor vital signs, respiratory status, and labs (WBC)  Monitor for signs of aspiration (tachypnea, cough, rales, wheezing, cyanosis, fever)  - Assess and monitor patient's ability to swallow  - Place patient up in chair to eat if possible  - HOB up at 90 degrees to eat if unable to get patient up into chair   - Supervise patient during oral intake  - Instruct patient/ family to take small bites  - Instruct patient/ family to take small single sips when taking liquids  - Follow patient-specific strategies generated by speech pathologist   Outcome: Progressing  Goal: Ventilated patient's risk of aspiration is minimized  Description: Assess and monitor vital signs, respiratory status, airway cuff pressure, and labs (WBC)  Monitor for signs of aspiration (tachypnea, cough, rales, wheezing, cyanosis, fever)  - Elevate head of bed 30 degrees if patient has tube feeding   - Monitor tube feeding  Outcome: Progressing     Problem: Nutrition  Goal: Nutrition/Hydration status is improving  Description: Monitor and assess patient's nutrition/hydration status for malnutrition (ex- brittle hair, bruises, dry skin, pale skin and conjunctiva, muscle wasting, smooth red tongue, and disorientation)  Collaborate with interdisciplinary team and initiate plan and interventions as ordered  Monitor patient's weight and dietary intake as ordered or per policy  Utilize nutrition screening tool and intervene per policy  Determine patient's food preferences and provide high-protein, high-caloric foods as appropriate  - Assist patient with eating   - Allow adequate time for meals   - Encourage patient to take dietary supplement as ordered  - Collaborate with clinical nutritionist   - Include patient/family/caregiver in decisions related to nutrition    Outcome: Progressing     Problem: PAIN - ADULT  Goal: Verbalizes/displays adequate comfort level or baseline comfort level  Description: Interventions:  - Encourage patient to monitor pain and request assistance  - Assess pain using appropriate pain scale0-10  - Administer analgesics based on type and severity of pain and evaluate response  - Implement non-pharmacological measures as appropriate and evaluate response  - Consider cultural and social influences on pain and pain management  - Notify physician/advanced practitioner if interventions unsuccessful or patient reports new pain  Outcome: Progressing     Problem: INFECTION - ADULT  Goal: Absence or prevention of progression during hospitalization  Description: INTERVENTIONS:  - Assess and monitor for signs and symptoms of infection  - Monitor lab/diagnostic results  - Monitor all insertion sites,  - Monitor endotracheal if appropriate and nasal secretions for changes in amount and color  - [place] appropriate cooling/warming therapies per order  - Administer medications as ordered  - Instruct and encourage patient and family to use good hand hygiene technique  - Identify and instruct in appropriate isolation precautions for identified infection/condition  Outcome: Progressing  Goal: Absence of fever/infection during neutropenic period  Description: INTERVENTIONS:  - Monitor WBC    Outcome: Progressing     Problem: SAFETY ADULT  Goal: Patient will remain free of falls  Description: INTERVENTIONS:  - Assess patient frequently for physical needs  -  Identify cognitive and physical deficits and behaviors that affect risk of falls    -  [place] fall precautions as indicated by assessment   - Educate patient/family on patient safety including physical limitations  - Instruct patient to call for assistance with activity based on assessment  - Modify environment to reduce risk of injury  - Consider OT/PT consult to assist with strengthening/mobility  Outcome: Progressing  Goal: Maintain or return to baseline ADL function  Description: INTERVENTIONS:  -  Assess patient's ability to carry out ADLs; assess patient's baseline for ADL function and identify physical deficits which impact ability to perform ADLs (bathing, care of mouth/teeth, toileting, grooming, dressing, etc )  - Assess/evaluate cause of self-care deficits   - Assess range of motion  - Assess patient's mobility; develop plan if impaired  - Assess patient's need for assistive devices and provide as appropriate  - Encourage maximum independence but intervene and supervise when necessary  - Involve family in performance of ADLs  - Assess for home care needs following discharge   - Consider OT consult to assist with ADL evaluation and planning for discharge  - Provide patient education as appropriate  Outcome: Progressing  Goal: Maintain or return mobility status to optimal level  Description: INTERVENTIONS:  - Assess patient's baseline mobility status (ambulation, transfers, stairs, etc )    - Identify cognitive and physical deficits and behaviors that affect mobility  - Identify mobility aids required to assist with transfers and/or ambulation (gait belt, sit-to-stand, lift, walker, cane, etc )  - [place] fall precautions as indicated by assessment  - Record patient progress and toleration of activity level on Mobility SBAR; progress patient to next Phase/Stage  - Instruct patient to call for assistance with activity based on assessment  - Consider rehabilitation consult to assist with strengthening/weightbearing, etc   Outcome: Progressing     Problem: DISCHARGE PLANNING  Goal: Discharge to home or other facility with appropriate resources  Description: INTERVENTIONS:  - Identify barriers to discharge w/patient and caregiver  - Arrange for needed discharge resources and transportation as appropriate  - Identify discharge learning needs (meds, wound care, etc )  - Arrange for interpretive services to assist at discharge as needed  - Refer to Case Management Department for coordinating discharge planning if the patient needs post-hospital services based on physician/advanced practitioner order or complex needs related to functional status, cognitive ability, or social support system  Outcome: Progressing     Problem: Knowledge Deficit  Goal: Patient/family/caregiver demonstrates understanding of disease process, treatment plan, medications, and discharge instructions  Description: Complete learning assessment and assess knowledge base  Interventions:  - Provide teaching at level of understanding  - Provide teaching via preferred learning methods  Outcome: Progressing     Problem: Potential for Falls  Goal: Patient will remain free of falls  Description: INTERVENTIONS:  - Assess patient frequently for physical needs  -  Identify cognitive and physical deficits and behaviors that affect risk of falls    -  [place] fall precautions as indicated by assessment   - Educate patient/family on patient safety including physical limitations  - Instruct patient to call for assistance with activity based on assessment  - Modify environment to reduce risk of injury  - Consider OT/PT consult to assist with strengthening/mobility  Outcome: Progressing

## 2021-03-01 NOTE — NURSING NOTE
Peripheral IV removed  Belongings reviewed and sent home with pt  AVS printed and reviewed with pt  All questions answered  Pt taken to car via WC by PCA  Stroke education given to pt

## 2021-03-01 NOTE — ASSESSMENT & PLAN NOTE
· Chest and back possibility of bedbugs his itching mostly at night  Will place on isolation precautions  Place hydrocortisone cream   Improved gave prescription for hydrocortisone twice a day till the rash improves and also Atarax p r n  Dortha Plough

## 2021-03-01 NOTE — ASSESSMENT & PLAN NOTE
· Suspected patient has BPH as he had been dribbling urgency incomplete voiding and also frequency    Will place patient on Flomax 0 4 mg daily till he follows up with his primary physician

## 2021-03-01 NOTE — OCCUPATIONAL THERAPY NOTE
Occupational Therapy Evaluation     Patient Name: Chantale Mendez  LNDAY'Z Date: 3/1/2021  Problem List  Principal Problem:    Acute CVA (cerebrovascular accident) [facility])  Active Problems:    Hypothyroidism    Tobacco abuse    Rash    Ascending aortic aneurysm [facility])    Past Medical History  Past Medical History:   Diagnosis Date    Disease of thyroid gland      Past Surgical History  Past Surgical History:   Procedure Laterality Date    APPENDECTOMY      APPENDECTOMY      HAND TENDON SURGERY  2013    ROTATOR CUFF REPAIR Right            03/01/21 0731   Note Type   Note type Evaluation   Restrictions/Precautions   Other Precautions Chair Alarm; Bed Alarm; Fall Risk   Pain Assessment   Pain Assessment Tool 0-10   Pain Score No Pain   Home Living   Type of Home [place] home  (6 DEE B HR)   Home Layout One level   Bathroom Shower/Tub Walk-in shower   Bathroom Toilet Standard   Additional Comments Pt reports living in a mobile home with 6 DEE B HR  Pt ambulates with no AD at baseline   Prior Function   Level of [place] Independent with ADLs and functional mobility   Lives With Spouse  Ricardo Michael)   Receives Help From Family   ADL Assistance Independent   IADLs Independent  (+ driving)   Falls in the last 6 months 0   Comments Pt reports completing all ADLs, IADLs, functional ambulation and community mobility + driving @ I   ADL   Where Assessed Edge of bed   Grooming Assistance   (SBA - standing at sinkside)   Grooming Deficit Verbal cueing;Supervision/safety; Increased time to complete;Wash/dry hands;Brushing hair   UB Dressing Assistance 5  Supervision/Setup   UB Dressing Deficit Setup   LB Dressing Assistance   ([address] )   LB Dressing Deficit Steadying;Verbal cueing;Supervision/safety; Increased time to complete; Don/doff L sock; Don/doff R sock; Thread RLE into pants; Thread LLE into pants;Pull up over hips   Toileting Assistance    ([address] )   Toileting Deficit Steadying;Verbal cueing;Supervison/safety; Increased time to complete;Grab bar use;Clothing management up;Clothing management down;Perineal hygiene   Additional Comments Pt completing ADL tasks while seated at EOB  UB Dressing @ S after set-up  LB Dressing @ [address]  for CM aorund waist while standing with no AD for UB support  donning socks/shoes @ S with increased time PRN  Pt completing toileting tasks @ Steadying Assist with no AD for UB support  Pt standing at sinkside to complete hand hygiene @ SBA with increased time, UB support from sinkside PRN   Bed Mobility   Supine to Sit 5  Supervision   Additional items Increased time required;Verbal cues   Additional Comments HOB flat no bedrails  Pt reports dizziness with initially sitting up from supine  Pt reports "that happens to me in the morning when I first get up"   Transfers   Sit to Stand   (SBA)   Additional items Impulsive;Verbal cues   Stand to Sit   (SBA)   Additional items Impulsive;Verbal cues   Stand pivot   (Steadying Assist)   Additional items Impulsive;Verbal cues   Additional Comments Pt completing functional transfers with no AD for UB support  SBA for STS and Steadying Assist for SPT   Balance   Static Sitting Good   Dynamic Sitting Fair +   Static Standing Fair   Dynamic Standing Fair -   Activity Tolerance   Activity Tolerance Patient tolerated treatment well;Patient limited by fatigue   Medical Staff Made Aware Spoke with PT, Bobo Ferreira   Nurse Made Aware Spoke with RNMarilu Assessment   RUE Assessment WFL   LUE Assessment   LUE Assessment WFL   Hand Function   Gross Motor Coordination Functional   Fine Motor Coordination Functional   Sensation   Light Touch No apparent deficits   Cognition   Arousal/Participation Alert; Responsive; Cooperative   Attention Attends with cues to redirect   Orientation Level Oriented X4   Memory Within functional limits   Following Commands Follows one step commands without difficulty   Comments Pt would benefit from continued cognitive assessment  Pt impulsive at times and demonstrates decreased safety awareness   Assessment   Limitation Decreased ADL status; Decreased UE strength;Decreased Safe judgement during ADL;Decreased cognition;Decreased endurance;Decreased self-care trans;Decreased high-level ADLs   Prognosis Good   Assessment Pt is a 68 y/o M admitted to [facility] 2/28/2021 d/t experiencing dizziness, decreased balance, slurred speech and facial droop  Dx: acute CVA  Pt with PMHx impacting performance during functional tasks including: Ascending aortic aneurysm, hypothyroidism  Pt reports living at home with his wife in a 1 story mobile home with 6 DEE B HR  Pt reports completing ADLs, IADLs, functional ambulation and community mobility + driving @ I  On evaluation, Pt A&Ox4  Pt completing bed mobility @ S  UB dressing @ S after se-tup  LB Dressing @ Steadying Assist  Pt completing STS @ SBA  SPT with no AD @ Steadying Assist  Pt completing toileting tasks @ Steadying Assist and standing at sinkside while completing hand hygiene @ SBA  Pt with decreased balance noted and decreased safety awareness, impulsive at times  Pt BUE ROm and MS WFL  Pt present with decrease activity tolerance, decrease standing balance, decrease performance during ADL tasks, decrease cognition, decrease safety awareness , increase impulsiveness, decrease UB MS, decrease generalized strength, decrease activity engagement and decrease performance during functional transfers  Pt would benefit from continued acute OT services to address deficits although is not expected to require any further OT needs upon d/c from [facility]  Plan   Treatment Interventions ADL retraining;Functional transfer training; Endurance training;UE strengthening/ROM; Cognitive reorientation;Patient/family training;Equipment evaluation/education; Neuromuscular reeducation; Compensatory technique education;Continued evaluation; Energy conservation; Activityengagement   Goal Expiration Date 03/11/21   OT Frequency 2-3x/wk   Recommendation   Recommendation   (no OT needs)   OT Discharge Recommendation Return to previous environment with social support   AM-PAC Daily Activity Inpatient   Lower Body Dressing 3   Bathing 3   Toileting 3   Upper Body Dressing 3   Grooming 3   Eating 4   Daily Activity Raw Score 19   Daily Activity Standardized Score (Calc for Raw Score >=11) 40 22   AM-[facility] Applied Cognition Inpatient   Following a Speech/Presentation 3   Understanding Ordinary Conversation 3   Taking Medications 3   Remembering Where Things Are Placed or Put Away 3   Remembering List of 4-5 Errands 3   Taking Care of Complicated Tasks 3   Applied Cognition Raw Score 18   Applied Cognition Standardized Score 38 07       The patient's raw score on the AM-PAC Daily Activity inpatient short form is 19, standardized score is 40 22, greater than 39 4  Patients at this level are likely to benefit from DC to home  Please refer to the recommendation of the Occupational Therapist for safe DC planning  Pt goals to be met by 3/11/2021    1  Pt will demonstrate ability to complete LB dressing @ I in order to increase safety and independence during meaningful tasks  2  Pt will demonstrate ability to sarah/doff socks/shoes while sitting EOB @ I in order to increase safety and independence during meaningful tasks  3  Pt will demonstrate ability to complete toileting tasks including CM and pericare @ I in order to increase safety and independence during meaningful tasks  4  Pt will demonstrate ability to complete EOB, chair, toilet/commode transfers @ I in order to increase performance and participation during functional tasks  5  Pt will demonstrate ability to stand for 10+ minutes while maintaining G balance with use of no AD for UB support    6  Pt will demonstrate ability to tolerate 30-35 minute OT session with no vc'ing for deep breathing or use of energy conservation techniques in order to increase activity tolerance during functional tasks  7  Pt will demonstrate Good carryover of use of energy conservation/compensatory strategies during ADLs and functional tasks in order to increase safety and reduce risk for falls  8  Pt will demonstrate Good attention and participation in continued evaluation of functional ambulation house hold distances in order to assist with safe d/c planning  9  Pt will attend to continued cognitive assessments 100% of the time in order to provide most appropriate d/c recommendations  10  Pt will follow 100% simple 2-step commands and be A&O x4 consistently with environmental cues to increase participation in functional activities  11  Pt will identify 3 areas of interest/hobbies and 1 intervention on how to incorporate into daily life in order to increase interaction with environment and peers as well as increase participation in meaningful tasks  12  Pt will demonstrate 100% carryover of BUE HEP in order to increase BUE MS and increase performance during functional tasks upon d/c home      Naheed Wilkinson OTR/L

## 2021-03-01 NOTE — SOCIAL WORK
CM met with the patient at bedside to review the CM role and discuss possible dc needs  Pt lives with his wife in a one story mobile home in [facility] with 6 DEE  Pt was IPTA  Pt has no DME and is ambulatory without assistance  Pt denies any history of drug/etoh abuse, mental illness or inpatient psych admissions  Pt denies any history of SNF/Rehab or VNA  Pt does not have a  POA/LW  Preferred Pharmacy: [address] [place] [address]  Contact: Luis Flannery, wife, [phone]  PCP: Dr Clem Mcdonough    CM reviewed d/c planning process including the following: identifying help at home, patient preference for d/c planning needs, availability of treatment team to discuss questions or concerns patient and/or family may have regarding understanding medications and recognizing signs and symptoms once discharged  CM also encouraged patient to follow up with all recommended appointments after discharge  Patient advised of importance for patient and family to participate in managing patients medical well being

## 2021-03-01 NOTE — PLAN OF CARE
Problem: PHYSICAL THERAPY ADULT  Goal: Performs mobility at highest level of function for planned discharge setting  See evaluation for individualized goals  Description: Treatment/Interventions: Functional transfer training, LE strengthening/ROM, Elevations, Therapeutic exercise, Endurance training, Patient/family training, Equipment eval/education, Bed mobility, Gait training, Compensatory technique education, Spoke to nursing, OT  Equipment Recommended: (TBD)       See flowsheet documentation for full assessment, interventions and recommendations  8/[id number] by Alban Gutierrez PT  Outcome: Progressing  Note: Prognosis: Good  Problem List: Decreased strength, Decreased endurance, Impaired balance, Decreased mobility, Impaired judgement, Decreased safety awareness  Pt requires verbal cues and tactile cues for safety with mobility and balance  He is impulsive with mobility  He is at an increased fall risk as indicated by score of 6/12 on 4 item DGI  He ambulates with decreased safety with use of spc compared to no AD as he is too impulsive for proper use of spc  He will continue to benefit from PT services to increase mobility  Recommend increased support from spouse at home and outpatient PT services upon D/C  Barriers to Discharge: None     PT Discharge Recommendation: Return to previous environment with social support, Other (Comment)(OPPT services)     PT - OK to Discharge: (when medically cleared with inc support from spouse)    See flowsheet documentation for full assessment

## 2021-03-01 NOTE — SOCIAL WORK
Discussed pt during care co ordination rounds, CM, nursing, supervisor and physical therapist all present  Pt was admitted with acute stroke  MRI shows a right internal capsule stoke  Echo pending  Discharge plan is to dc home with OP PT and follow up with OP neurology

## 2021-03-01 NOTE — DISCHARGE SUMMARY
Discharge- Ella Appl 1950, 69 y o  male MRN: [id number]    Unit/Bed#: -01 Encounter: [id number]    Primary Care Provider: Kaitlin Jarvis MD   Date and time admitted to hospital: 2/28/2021  9:23 AM        * Acute CVA (cerebrovascular accident) [facility])  Assessment & Plan  · Suspected subacute - symptoms all started 3 days ago and progressed to today - outside of window for TPA  · Appreciate neurology consultation to be added highly stroke  Aspirin 81 mg daily Lipitor 40 mg daily stroke pathway no  evidence of stenosis at the CTA  Mri brain -   · 1  Acute to subacute infarct involving posterior limb of right internal capsule  No significant edema or mass effect  No hemorrhagic transformation  · Patient does have tobacco abuse longstanding history of  No previous history of hypertension  Secondary to subacute no need for permissive hypertension  · Telemetry no arrhythmia  · PT OT neurology to follow up recommendation for outpatient therapy which I have provided  · Lipid panel and hemoglobin A1c lipid panel LDL sub optimal above 100 continue Lipitor 40 mg daily hemoglobin A1c is controlled 5 1  · CT brain is negative for any acute intracranial hemorrhage or stroke  · nih 3  · Also new onset hypertension a gave him Norvasc 5 mg daily improved will continue that as an outpatient  Goal BP is less than 130/80  · In terms of antiplatelets he will be on aspirin 81 mg daily I have loaded him with Plavix 300 mg p o  X1 today and then starting 75 mg daily from tomorrow dual antiplatelets will be for 21 days and then he is going to be on aspirin alone I did provide outpatient referral for Neurology  BPH (benign prostatic hyperplasia)  Assessment & Plan  · Suspected patient has BPH as he had been dribbling urgency incomplete voiding and also frequency    Will place patient on Flomax 0 4 mg daily till he follows up with his primary physician    Hypertension  Assessment & Plan  · Started Norvasc 5 mg daily blood pressure improved after that  Ascending aortic aneurysm (HCC)  Assessment & Plan  · 4 5 cm he is a smoker  Will need outpatient follow-up with Cardiology/Cardiothoracic surgery to keep surveillance made referral for outpatient cardiology so he could keep surveillance on that  Also control blood pressure I started Norvasc 5 mg daily and blood pressure improved  Rash  Assessment & Plan  · Chest and back possibility of bedbugs his itching mostly at night  Will place on isolation precautions  Place hydrocortisone cream   Improved gave prescription for hydrocortisone twice a day till the rash improves and also Atarax p r n     Tobacco abuse  Assessment & Plan  · Longstanding history stays he smokes about 0 5 packs per day  Will place him on nicotine patch and also discussed smoking cessation    Hypothyroidism  Assessment & Plan  · His TSH is normal continue Synthroid          Discharging Physician / Practitioner: Esther Mack MD  PCP: Valerie Nath MD  Admission Date:   Admission Orders (From admission, onward)     Ordered        02/28/21 1034  Inpatient Admission  Once                   Discharge Date: 03/01/21    Resolved Problems  Date Reviewed: 3/1/2021    None          Consultations During Hospital Stay:  · Neurology    Procedures Performed:   · None    Significant Findings / Test Results:   · CT brain- Cerebral atrophy with chronic small vessel ischemic white matter disease  No acute intracranial abnormality        If there is continued concern for acute cerebral ischemia, recommend follow-up MRI of the brain with diffusion-weighted sequencing, given the degree of chronic small vessel ischemic change  · CTA head and neck- 1  Mild atherosclerotic changes involving the cervical internal carotid arteries bilaterally  No flow-limiting stenosis    No evidence of carotid or vertebral artery dissection      2   Mild atherosclerotic changes involving the cavernous and supraclinoid internal carotid arteries bilaterally as well as the intracranial segments of the vertebral arteries bilaterally  No evidence of large vessel central occlusive disease involving   the [place]      3   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      3  No pulmonary parenchymal changes to suggest COVID19 infection      4   4 5 cm ascending aortic aneurysm  No evidence of dissection  · MRI of the brain-   1  Acute to subacute infarct involving posterior limb of right internal capsule  No significant edema or mass effect  No hemorrhagic transformation      2  Chronic microangiopathy  · Chest x-ray is negative  · 2D echo EF 60-65%- no PFO grade 1 diastolic dysfunction dilatation of the ascending aorta was 3 9 cm    Incidental Findings:   · See above     Test Results Pending at Discharge (will require follow up): · None     Outpatient Tests Requested:  · None    Complications:  None    Reason for Admission:  Slurry speech and balance    Hospital Course:     Margoth Middleton is a 69 y o  male patient who originally presented to the hospital on 2/28/2021 due to slurry speech and balance and he had a left-sided facial droop with minimal left upper extremity weakness  Suspected subacute stroke as his symptoms actually started to happen 3 days prior not a candidate tPA stroke alert was called CT negative CTA is no significant stenosis  Admitted with aspirin Lipitor  MRI showed acute to subacute infarct  He is going to be on aspirin and Plavix for 21 days on aspirin alone LDL is 123 he is going to be on Lipitor 40 mg daily new onset hypertension place him on Norvasc 5 mg daily he was found to have a aortic ascending aneurysm home will need outpatient close follow-up referral made to Cardiology  Evaluated by PT OT and speech outpatient referrals placed  Hemoglobin A1c is normal   E 2D echo was done no PFO normal EF  No arrhythmias on the monitor that he has been on    Patient also complaining of symptoms likely related to BPH I did start with Flomax on him  Also I discussed with him in terms of smoking cessation the importance of it and prescribed him a nicotine patch  Otherwise medically clear to be discharged home    The patient, initially admitted to the hospital as inpatient, was discharged earlier than expected given the following: Improved earlier than expected  Please see above list of diagnoses and related plan for additional information  Condition at Discharge: stable     Discharge Day Visit / Exam:     Subjective:  Patient seen and examined no chest pain or shortness of breath no new or worsening symptoms of stroke  Vitals: Blood Pressure: 136/99 (03/01/21 1520)  Pulse: 68 (03/01/21 1520)  Temperature: 97 5 °F (36 4 °C) (03/01/21 1406)  Temp Source: Oral (03/01/21 1406)  Respirations: 18 (03/01/21 1406)  Weight - Scale: 95 7 kg (210 lb 15 7 oz) (02/28/21 0920)  SpO2: 93 % (03/01/21 1520)  Exam:   Physical Exam  Vitals signs and nursing note reviewed  Constitutional:       Appearance: He is well-developed  HENT:      Head: Normocephalic and atraumatic  Eyes:      Conjunctiva/sclera: Conjunctivae normal    Neck:      Musculoskeletal: Neck supple  Cardiovascular:      Rate and Rhythm: Normal rate and regular rhythm  Heart sounds: No murmur  Pulmonary:      Effort: Pulmonary effort is normal  No respiratory distress  Breath sounds: Normal breath sounds  Abdominal:      Palpations: Abdomen is soft  Tenderness: There is no abdominal tenderness  Skin:     General: Skin is warm and dry  Neurological:      Mental Status: He is alert and oriented to person, place, and time        Comments: Focal today is left-sided facial droop speech is understandable without any slurring         Discussion with Family:  Daughter and wife    Discharge instructions/Information to patient and family:   See after visit summary for information provided to patient and family  Provisions for Follow-Up Care:  See after visit summary for information related to follow-up care and any pertinent home health orders  Disposition:     Home    For Discharges to [facility] SNF:   · Not Applicable to this Patient - Not Applicable to this Patient    Planned Readmission: no     Discharge Statement:  I spent >35 minutes discharging the patient  This time was spent on the day of discharge  I had direct contact with the patient on the day of discharge  Greater than 50% of the total time was spent examining patient, answering all patient questions, arranging and discussing plan of care with patient as well as directly providing post-discharge instructions  Additional time then spent on discharge activities  Discharge Medications:  See after visit summary for reconciled discharge medications provided to patient and family        ** Please Note: This note has been constructed using a voice recognition system **

## 2021-03-02 ENCOUNTER — TELEPHONE (OUTPATIENT)
Dept: NEUROLOGY | Facility: CLINIC | Age: 71
End: 2021-03-02

## 2021-03-02 ENCOUNTER — TELEPHONE (OUTPATIENT)
Dept: CARDIOLOGY CLINIC | Facility: CLINIC | Age: 71
End: 2021-03-02

## 2021-03-02 NOTE — TELEPHONE ENCOUNTER
1ST ATTEMPT LMOM For pt to call in to sched HFU appt  GSL/Stroke/Medicare    NOTE FROM CHART:  Reason for Consult / Principal Problem:  Stroke  Tonya Bowen will need follow up in at the next regular appointment with neurovascular attending or advance practitioner  He will not require outpatient neurological testing

## 2021-03-02 NOTE — TELEPHONE ENCOUNTER
Daughter tyler states that pt was in the hospital for a stroke and has an aortic aneurysm   Pt needs to be seen, but the first thing available is may 6 th

## 2021-03-03 NOTE — TELEPHONE ENCOUNTER
The pts daughters ,[place] called to see if we were able to move the pts appt up   he pt also has a newly dx AAA and the family is not  know if they should wait until May to see the cardiologist  I offered alternative [facility] locations as an option for the initial visit and then to follow up in [facility]  The daughter will speak with her sister and call us back to let us know if they are ok with that  I told her I will also let Dr Geremias Mckeon know and see if she has any recommendations

## 2021-03-04 NOTE — PHYSICIAN ADVISOR
Current patient class: Inpatient  The patient is currently on Hospital Day: 2 at [facility]      The patient was admitted to the hospital  on 2/28/21 at 1034 for the following diagnosis:  Slurred speech [R47 81]  Stroke [facility]) [I63 9]  Ascending aortic aneurysm ([facility] [address] ) [I71 2]     After review of the relevant documentation, labs, vital signs and test results, this is a PROVIDER LIABLE case  In this particular case the patient was admitted to the hospital as an inpatient  The patient however failed to satisfy the 2 midnight benchmark and closer scrutiny of the case was warranted  After review of the patient presentation and relevant labs the patient was most appropriate for observation or outpatient class at the time of admission  Given that this patient has already been discharged prior to this review they become a provider liable case  Rationale is as follows: The patient is a 69 yrs   Male who presented to the ED at 2/28/2021  9:23 AM with a chief complaint of Slurred Speech (pts states a few days ago) and Extremity Weakness (loss of balance)   69 y o  male patient who originally presented to the hospital on 2/28/2021 due to slurred speech and balance problems and he had a left-sided facial droop with minimal left upper extremity weakness  On admission, a subacute stroke was suspected as his symptoms actually started to happen 3 days prior not a candidate tPA  CTH negative and CTA is no significant stenosis  He was admitted on stoke pathway  MRI showed acute to subacute infarct and per neurology suggest that this stroke most likely represents microvascular disease  He was discharged following PT recommendations for outpatient services                  The patients vitals on arrival were   ED Triage Vitals   Temperature Pulse Respirations Blood Pressure SpO2   02/28/21 0920 02/28/21 0920 02/28/21 0920 02/28/21 0920 02/28/21 0930   (!) 96 9 °F (36 1 °C) 82 16 135/87 95 % Temp Source Heart Rate Source Patient Position - Orthostatic VS BP Location FiO2 (%)   02/28/21 0920 02/28/21 0935 02/28/21 0920 02/28/21 1439 --   Temporal Monitor Lying Right arm       Pain Score       02/28/21 1130       No Pain           Past Medical History:   Diagnosis Date    Disease of thyroid gland      Past Surgical History:   Procedure Laterality Date    APPENDECTOMY      APPENDECTOMY      HAND TENDON SURGERY  2013    ROTATOR CUFF REPAIR Right            Consults have been placed to:   IP CONSULT TO NEUROLOGY  IP CONSULT TO CASE MANAGEMENT  IP CONSULT TO NUTRITION SERVICES    Vitals:    03/01/21 0642 03/01/21 1042 03/01/21 1406 03/01/21 1520   BP: 130/86 (!) 144/106 (!) 139/102 136/99   BP Location: Left arm Left arm Left arm    Pulse: 62 68 62 68   Resp: 18 20 18    Temp: 98 6 °F (37 °C) (!) 97 1 °F (36 2 °C) 97 5 °F (36 4 °C)    TempSrc: Oral Oral Oral    SpO2: 94% 96% 96% 93%   Weight:           Most recent labs:    No results for input(s): WBC, HGB, HCT, PLT, K, NA, CALCIUM, BUN, CREATININE, LIPASE, AMYLASE, INR, TROPONINI, CKTOTAL, AST, ALT, ALKPHOS, BILITOT in the last 72 hours  Scheduled Meds:  Continuous Infusions:No current facility-administered medications for this encounter  PRN Meds:      Surgical procedures (if appropriate):

## 2021-03-04 NOTE — TELEPHONE ENCOUNTER
Deana called back  They are ok waiting and moving forward with the testing  I will call her (at [phone]) as soon as the order is placed to schedule it

## 2021-03-04 NOTE — TELEPHONE ENCOUNTER
We do not intervene on AAA until they are over 5 cm  His was 4 5 cm  We can get an MRI of aorta prior to office visit or CT scan (prefer MRI to reduce dye exposure and radiation) to assess the AAA in more detail  Please explain to them that the risk of AAA rupture is not increased until 5 5 cm in diameter and that I am completely comfortable with him waiting to see me as long as they are as long as his blood pressure is controlled

## 2021-03-05 DIAGNOSIS — I71.2 ASCENDING AORTIC ANEURYSM (HCC): Primary | ICD-10-CM

## 2021-03-09 DIAGNOSIS — N40.1 BPH WITH OBSTRUCTION/LOWER URINARY TRACT SYMPTOMS: Primary | ICD-10-CM

## 2021-03-09 DIAGNOSIS — N13.8 BPH WITH OBSTRUCTION/LOWER URINARY TRACT SYMPTOMS: Primary | ICD-10-CM

## 2021-03-10 NOTE — TELEPHONE ENCOUNTER
3RD ATTEMPT LMOM For pt to call in to sched HFU appt    Mailed letter to pt's home      GSL/Stroke/Medicare

## 2021-03-10 NOTE — TELEPHONE ENCOUNTER
Received voicemail message from pt's dtr ([phone]) returning Liza's phone call       Called [phone] and left a message on pt's answering machine for a call back    Called [phone] and left a message on pt's answering machine for a call back

## 2021-03-10 NOTE — TELEPHONE ENCOUNTER
Per Dr Choi Bolds "would load with Plavix 300 mg p o  x1 and then 75 mg daily  He should remain on dual anti-platelet therapy with aspirin and Plavix for a total of 3 weeks only at which time Plavix should be discontinued and he should remain on aspirin monotherapy "    Other than scheduling the appt, patient will need reminder of med plan above  Called [phone] with no answer  Left message on voicemail box for call back  Called [phone] with no answer  Left message on voicemail box for call back

## 2021-03-11 ENCOUNTER — HOSPITAL ENCOUNTER (OUTPATIENT)
Dept: MRI IMAGING | Facility: HOSPITAL | Age: 71
Discharge: HOME | End: 2021-03-11
Attending: INTERNAL MEDICINE
Payer: MEDICARE

## 2021-03-11 DIAGNOSIS — I71.2 ASCENDING AORTIC ANEURYSM (HCC): ICD-10-CM

## 2021-03-11 PROCEDURE — G1004 CDSM NDSC: HCPCS

## 2021-03-11 PROCEDURE — A9585 GADOBUTROL INJECTION: HCPCS | Performed by: INTERNAL MEDICINE

## 2021-03-11 PROCEDURE — C8911 MRA W/O FOL W/CONT, CHEST: HCPCS

## 2021-03-11 RX ADMIN — GADOBUTROL 10 ML: 604.72 INJECTION INTRAVENOUS at 18:00

## 2021-03-12 ENCOUNTER — HOSPITAL ENCOUNTER (EMERGENCY)
Facility: HOSPITAL | Age: 71
Discharge: HOME | End: 2021-03-12
Attending: EMERGENCY MEDICINE
Payer: MEDICARE

## 2021-03-12 VITALS
TEMPERATURE: 98.5 F | OXYGEN SATURATION: 99 % | DIASTOLIC BLOOD PRESSURE: 81 MMHG | SYSTOLIC BLOOD PRESSURE: 116 MMHG | BODY MASS INDEX: 26.03 KG/M2 | HEART RATE: 68 BPM | WEIGHT: 202.82 LBS | HEIGHT: 74 IN | RESPIRATION RATE: 18 BRPM

## 2021-03-12 DIAGNOSIS — R42 DIZZINESS: Primary | ICD-10-CM

## 2021-03-12 LAB
ANION GAP SERPL CALCULATED.3IONS-SCNC: 7 MMOL/L (ref 4–13)
ATRIAL RATE: 66 BPM
BACTERIA UR QL AUTO: ABNORMAL /HPF
BASOPHILS # BLD AUTO: 0.09 THOUSANDS/ΜL (ref 0–0.1)
BASOPHILS NFR BLD AUTO: 1 % (ref 0–1)
BILIRUB UR QL STRIP: NEGATIVE
BUN SERPL-MCNC: 21 MG/DL (ref 5–25)
CALCIUM SERPL-MCNC: 8.7 MG/DL (ref 8.3–10.1)
CHLORIDE SERPL-SCNC: 107 MMOL/L (ref 100–108)
CLARITY UR: CLEAR
CO2 SERPL-SCNC: 29 MMOL/L (ref 21–32)
COLOR UR: YELLOW
CREAT SERPL-MCNC: 1.19 MG/DL (ref 0.6–1.3)
EOSINOPHIL # BLD AUTO: 0.29 THOUSAND/ΜL (ref 0–0.61)
EOSINOPHIL NFR BLD AUTO: 3 % (ref 0–6)
ERYTHROCYTE [DISTWIDTH] IN BLOOD BY AUTOMATED COUNT: 12.4 % (ref 11.6–15.1)
FINE GRAN CASTS URNS QL MICRO: ABNORMAL /LPF
GFR SERPL CREATININE-BSD FRML MDRD: 61 ML/MIN/1.73SQ M
GLUCOSE SERPL-MCNC: 84 MG/DL (ref 65–140)
GLUCOSE SERPL-MCNC: 84 MG/DL (ref 65–140)
GLUCOSE UR STRIP-MCNC: NEGATIVE MG/DL
HCT VFR BLD AUTO: 42.3 % (ref 36.5–49.3)
HGB BLD-MCNC: 14.9 G/DL (ref 12–17)
HGB UR QL STRIP.AUTO: ABNORMAL
HYALINE CASTS #/AREA URNS LPF: ABNORMAL /LPF
IMM GRANULOCYTES # BLD AUTO: 0.05 THOUSAND/UL (ref 0–0.2)
IMM GRANULOCYTES NFR BLD AUTO: 0 % (ref 0–2)
KETONES UR STRIP-MCNC: NEGATIVE MG/DL
LEUKOCYTE ESTERASE UR QL STRIP: NEGATIVE
LYMPHOCYTES # BLD AUTO: 2.65 THOUSANDS/ΜL (ref 0.6–4.47)
LYMPHOCYTES NFR BLD AUTO: 23 % (ref 14–44)
MCH RBC QN AUTO: 32.1 PG (ref 26.8–34.3)
MCHC RBC AUTO-ENTMCNC: 35.2 G/DL (ref 31.4–37.4)
MCV RBC AUTO: 91 FL (ref 82–98)
MONOCYTES # BLD AUTO: 1.48 THOUSAND/ΜL (ref 0.17–1.22)
MONOCYTES NFR BLD AUTO: 13 % (ref 4–12)
NEUTROPHILS # BLD AUTO: 7.22 THOUSANDS/ΜL (ref 1.85–7.62)
NEUTS SEG NFR BLD AUTO: 60 % (ref 43–75)
NITRITE UR QL STRIP: NEGATIVE
NON-SQ EPI CELLS URNS QL MICRO: ABNORMAL /HPF
NRBC BLD AUTO-RTO: 0 /100 WBCS
P AXIS: 38 DEGREES
PH UR STRIP.AUTO: 5 [PH]
PLATELET # BLD AUTO: 271 THOUSANDS/UL (ref 149–390)
PMV BLD AUTO: 9.3 FL (ref 8.9–12.7)
POTASSIUM SERPL-SCNC: 4.2 MMOL/L (ref 3.5–5.3)
PR INTERVAL: 210 MS
PROT UR STRIP-MCNC: NEGATIVE MG/DL
QRS AXIS: -35 DEGREES
QRSD INTERVAL: 98 MS
QT INTERVAL: 418 MS
QTC INTERVAL: 438 MS
RBC # BLD AUTO: 4.64 MILLION/UL (ref 3.88–5.62)
RBC #/AREA URNS AUTO: ABNORMAL /HPF
SODIUM SERPL-SCNC: 143 MMOL/L (ref 136–145)
SP GR UR STRIP.AUTO: >=1.03 (ref 1–1.03)
T WAVE AXIS: 28 DEGREES
TROPONIN I SERPL-MCNC: <0.02 NG/ML
UROBILINOGEN UR QL STRIP.AUTO: 1 E.U./DL
VENTRICULAR RATE: 66 BPM
WBC # BLD AUTO: 11.78 THOUSAND/UL (ref 4.31–10.16)
WBC #/AREA URNS AUTO: ABNORMAL /HPF

## 2021-03-12 PROCEDURE — 99284 EMERGENCY DEPT VISIT MOD MDM: CPT

## 2021-03-12 PROCEDURE — 80048 BASIC METABOLIC PNL TOTAL CA: CPT | Performed by: PHYSICIAN ASSISTANT

## 2021-03-12 PROCEDURE — 93005 ELECTROCARDIOGRAM TRACING: CPT

## 2021-03-12 PROCEDURE — 99285 EMERGENCY DEPT VISIT HI MDM: CPT | Performed by: EMERGENCY MEDICINE

## 2021-03-12 PROCEDURE — 82948 REAGENT STRIP/BLOOD GLUCOSE: CPT

## 2021-03-12 PROCEDURE — 85025 COMPLETE CBC W/AUTO DIFF WBC: CPT | Performed by: PHYSICIAN ASSISTANT

## 2021-03-12 PROCEDURE — 84484 ASSAY OF TROPONIN QUANT: CPT | Performed by: PHYSICIAN ASSISTANT

## 2021-03-12 PROCEDURE — 81001 URINALYSIS AUTO W/SCOPE: CPT | Performed by: PHYSICIAN ASSISTANT

## 2021-03-12 PROCEDURE — 36415 COLL VENOUS BLD VENIPUNCTURE: CPT | Performed by: PHYSICIAN ASSISTANT

## 2021-03-12 RX ORDER — MECLIZINE HYDROCHLORIDE 25 MG/1
25 TABLET ORAL 3 TIMES DAILY PRN
Qty: 6 TABLET | Refills: 0 | Status: SHIPPED | OUTPATIENT
Start: 2021-03-12

## 2021-03-12 RX ORDER — MECLIZINE HYDROCHLORIDE 25 MG/1
25 TABLET ORAL ONCE
Status: COMPLETED | OUTPATIENT
Start: 2021-03-12 | End: 2021-03-12

## 2021-03-12 RX ADMIN — MECLIZINE HYDROCHLORIDE 25 MG: 25 TABLET ORAL at 12:55

## 2021-03-12 NOTE — ED ATTENDING ATTESTATION
3/12/2021  IPhill MD, saw and evaluated the patient  I have discussed the patient with the resident/non-physician practitioner and agree with the resident's/non-physician practitioner's findings, Plan of Care, and MDM as documented in the resident's/non-physician practitioner's note, except where noted  All available labs and Radiology studies were reviewed  I was present for key portions of any procedure(s) performed by the resident/non-physician practitioner and I was immediately available to provide assistance  At this point I agree with the current assessment done in the Emergency Department        ED Course         Critical Care Time  Procedures

## 2021-03-12 NOTE — DISCHARGE INSTRUCTIONS
Please keep your follow-up appointment with your neurologist   Please continue to take your blood thinners as prescribed  If you have any new or worsening symptoms please return immediately to the emergency department

## 2021-03-12 NOTE — ED PROVIDER NOTES
History  Chief Complaint   Patient presents with    Dizziness     started @ 0700today  Hx left sided facial droop  69year old male presents the emergency department for evaluation of episode of dizziness  Patient reports he woke up this morning around 0 700 states after getting out of bed he had sudden onset of room spinning around him dizziness  Denies any lightheadedness or near syncope  Denies any fall  States symptoms have since resolved  Patient with recent admission to the hospital on 02/28/2021 for stroke workup  At that time patient had MRI which was significant for acute to subacute infarct  Patient has since been on dual therapy of Plavix and aspirin  Patient reports he has been taking these as prescribed  Patient denies any headache, visual changes, speech changes  Patient reports he has left-sided facial droop for patient and wife reports this is significantly improved  Patient denies any chest pain, palpitations, shortness of breath  He denies any ear pain, ringing in the ears  Denies any weakness, numbness, paresthesias  Patient has schedule follow-up with Neurology  History provided by:  Patient  Dizziness  Quality:  Room spinning  Severity:  Mild  Onset quality:  Sudden  Progression:  Resolved  Chronicity:  New  Context: standing up    Relieved by:  None tried  Worsened by:  Standing up  Associated symptoms: no blood in stool, no chest pain, no diarrhea, no headaches, no hearing loss, no nausea, no palpitations, no shortness of breath, no syncope, no tinnitus, no vision changes, no vomiting and no weakness        Prior to Admission Medications   Prescriptions Last Dose Informant Patient Reported? Taking?    amLODIPine (NORVASC) 5 mg tablet   No No   Sig: Take 1 tablet (5 mg total) by mouth daily   aspirin 81 mg chewable tablet   No No   Sig: Chew 1 tablet (81 mg total) daily   atorvastatin (LIPITOR) 40 mg tablet   No No   Sig: Take 1 tablet (40 mg total) by mouth every evening clopidogrel (PLAVIX) 75 mg tablet   No No   Sig: Take 1 tablet (75 mg total) by mouth daily   hydrOXYzine HCL (ATARAX) 10 mg tablet   No No   Sig: Take 1 tablet (10 mg total) by mouth every 6 (six) hours as needed for itching   hydrocortisone 1 % cream   No No   Sig: Apply topically 2 (two) times a day   levothyroxine 100 mcg tablet   Yes No   Sig: Take by mouth daily Dose unknown   nicotine (NICODERM CQ) 14 mg/24hr TD 24 hr patch   No No   Sig: Place 1 patch on the skin daily   tamsulosin (FLOMAX) 0 4 mg   No No   Sig: Take 1 capsule (0 4 mg total) by mouth daily with dinner      Facility-Administered Medications: None       Past Medical History:   Diagnosis Date    Disease of thyroid gland        Past Surgical History:   Procedure Laterality Date    APPENDECTOMY      APPENDECTOMY      HAND TENDON SURGERY  2013    ROTATOR CUFF REPAIR Right        Family History   Problem Relation Age of Onset    Arthritis Mother     Diabetes Father      I have reviewed and agree with the history as documented  E-Cigarette/Vaping    E-Cigarette Use Never User      E-Cigarette/Vaping Substances    Nicotine No     THC No     CBD No     Flavoring No     Other No     Unknown No      Social History     Tobacco Use    Smoking status: Current Every Day Smoker     Packs/day: 0 50     Years: 9 00     Pack years: 4 50     Types: Cigarettes     Start date: 2011    Smokeless tobacco: Never Used   Substance Use Topics    Alcohol use: Not Currently     Frequency: Patient refused     Drinks per session: Patient refused     Binge frequency: Never    Drug use: Yes     Types: Marijuana     Comment: 2-3 times a week       Review of Systems   Constitutional: Negative  Negative for appetite change, chills, diaphoresis, fatigue and fever  HENT: Negative  Negative for hearing loss and tinnitus  Eyes: Negative  Negative for visual disturbance  Respiratory: Negative  Negative for shortness of breath      Cardiovascular: Negative  Negative for chest pain, palpitations and syncope  Gastrointestinal: Negative  Negative for blood in stool, diarrhea, nausea and vomiting  Genitourinary: Negative  Musculoskeletal: Negative  Skin: Negative  Negative for color change, rash and wound  Neurological: Positive for dizziness and facial asymmetry  Negative for tremors, seizures, syncope, speech difficulty, weakness, light-headedness, numbness and headaches  All other systems reviewed and are negative  Physical Exam  Physical Exam  Vitals signs and nursing note reviewed  Constitutional:       General: He is not in acute distress  Appearance: Normal appearance  He is normal weight  He is not ill-appearing, toxic-appearing or diaphoretic  HENT:      Head: Normocephalic and atraumatic  Right Ear: Tympanic membrane, ear canal and external ear normal       Left Ear: Tympanic membrane, ear canal and external ear normal       Nose: Nose normal  No congestion or rhinorrhea  Mouth/Throat:      Mouth: Mucous membranes are moist       Pharynx: Oropharynx is clear  No oropharyngeal exudate or posterior oropharyngeal erythema  Eyes:      General: No visual field deficit  Extraocular Movements: Extraocular movements intact  Conjunctiva/sclera: Conjunctivae normal       Pupils: Pupils are equal, round, and reactive to light  Comments: No nystagmus   Neck:      Musculoskeletal: Normal range of motion and neck supple  No muscular tenderness  Cardiovascular:      Rate and Rhythm: Normal rate and regular rhythm  Pulses:           Radial pulses are 2+ on the right side and 2+ on the left side  Dorsalis pedis pulses are 2+ on the right side and 2+ on the left side  Posterior tibial pulses are 2+ on the right side and 2+ on the left side  Pulmonary:      Effort: Pulmonary effort is normal       Breath sounds: Normal breath sounds  No stridor  No wheezing, rhonchi or rales     Chest: Chest wall: No tenderness  Abdominal:      General: Abdomen is flat  Bowel sounds are normal  There is no distension  Palpations: Abdomen is soft  Tenderness: There is no abdominal tenderness  There is no right CVA tenderness, left CVA tenderness or guarding  Musculoskeletal: Normal range of motion  General: No swelling  Right lower leg: No edema  Left lower leg: No edema  Skin:     General: Skin is warm and dry  Capillary Refill: Capillary refill takes less than 2 seconds  Coloration: Skin is not pale  Findings: No bruising, erythema, lesion or rash  Neurological:      General: No focal deficit present  Mental Status: He is alert and oriented to person, place, and time  GCS: GCS eye subscore is 4  GCS verbal subscore is 5  GCS motor subscore is 6  Cranial Nerves: Facial asymmetry present  No dysarthria  Sensory: Sensation is intact  No sensory deficit  Motor: No weakness, tremor, abnormal muscle tone or pronator drift  Coordination: Coordination is intact  Coordination normal  Finger-Nose-Finger Test normal       Gait: Gait is intact  Gait normal       Deep Tendon Reflexes: Reflexes normal    Psychiatric:         Mood and Affect: Mood normal          Behavior: Behavior normal          Thought Content:  Thought content normal          Judgment: Judgment normal          Vital Signs  ED Triage Vitals [03/12/21 1057]   Temperature Pulse Respirations Blood Pressure SpO2   98 5 °F (36 9 °C) 71 18 123/80 96 %      Temp Source Heart Rate Source Patient Position - Orthostatic VS BP Location FiO2 (%)   Temporal Monitor Lying Right arm --      Pain Score       No Pain           Vitals:    03/12/21 1126 03/12/21 1127 03/12/21 1128 03/12/21 1334   BP: 115/71 114/76 115/66 116/81   Pulse: 68 67 70 68   Patient Position - Orthostatic VS: Lying - Orthostatic VS Sitting - Orthostatic VS Standing - Orthostatic VS Sitting         Visual Acuity      ED Medications  Medications   meclizine (ANTIVERT) tablet 25 mg (25 mg Oral Given 3/12/21 1255)       Diagnostic Studies  Results Reviewed     Procedure Component Value Units Date/Time    Urine Microscopic [[id number]]  (Abnormal) Collected: 03/12/21 1140    Lab Status: Final result Specimen: Urine, Clean Catch Updated: 03/12/21 1157     RBC, UA 2-4 /hpf      WBC, UA None Seen /hpf      Epithelial Cells Occasional /hpf      Bacteria, UA Occasional /hpf      Hyaline Casts, UA 0-1 /lpf      Fine granular casts 0-1 /lpf     UA (URINE) with reflex to Scope [[id number]]  (Abnormal) Collected: 03/12/21 1140    Lab Status: Final result Specimen: Urine, Clean Catch Updated: 03/12/21 1145     Color, UA Yellow     Clarity, UA Clear     Specific Gravity, UA >=1 030     pH, UA 5 0     Leukocytes, UA Negative     Nitrite, UA Negative     Protein, UA Negative mg/dl      Glucose, UA Negative mg/dl      Ketones, UA Negative mg/dl      Urobilinogen, UA 1 0 E U /dl      Bilirubin, UA Negative     Blood, UA Small    Troponin I [[id number]]  (Normal) Collected: 03/12/21 1118    Lab Status: Final result Specimen: Blood from Arm, Left Updated: 03/12/21 1143     Troponin I <0 02 ng/mL     Basic metabolic panel [[id number]] Collected: 03/12/21 1118    Lab Status: Final result Specimen: Blood from Arm, Left Updated: 03/12/21 1135     Sodium 143 mmol/L      Potassium 4 2 mmol/L      Chloride 107 mmol/L      CO2 29 mmol/L      ANION GAP 7 mmol/L      BUN 21 mg/dL      Creatinine 1 19 mg/dL      Glucose 84 mg/dL      Calcium 8 7 mg/dL      eGFR 61 ml/min/1 73sq m     Narrative:      [place] guidelines for Chronic Kidney Disease (CKD):     Stage 1 with normal or high GFR (GFR > 90 mL/min/1 73 square meters)    Stage 2 Mild CKD (GFR = 60-89 mL/min/1 73 square meters)    Stage 3A Moderate CKD (GFR = 45-59 mL/min/1 73 square meters)    Stage 3B Moderate CKD (GFR = 30-44 mL/min/1 73 square meters)    Stage 4 Severe CKD (GFR = 15-29 mL/min/1 73 square meters)    Stage 5 End Stage CKD (GFR <15 mL/min/1 73 square meters)  Note: GFR calculation is accurate only with a steady state creatinine    Fingerstick Glucose (POCT) [[id number]]  (Normal) Collected: 03/12/21 1129    Lab Status: Final result Updated: 03/12/21 1131     POC Glucose 84 mg/dl     CBC and differential [[id number]]  (Abnormal) Collected: 03/12/21 1118    Lab Status: Final result Specimen: Blood from Arm, Left Updated: 03/12/21 1124     WBC 11 78 Thousand/uL      RBC 4 64 Million/uL      Hemoglobin 14 9 g/dL      Hematocrit 42 3 %      MCV 91 fL      MCH 32 1 pg      MCHC 35 2 g/dL      RDW 12 4 %      MPV 9 3 fL      Platelets 191 Thousands/uL      nRBC 0 /100 WBCs      Neutrophils Relative 60 %      Immat GRANS % 0 %      Lymphocytes Relative 23 %      Monocytes Relative 13 %      Eosinophils Relative 3 %      Basophils Relative 1 %      Neutrophils Absolute 7 22 Thousands/µL      Immature Grans Absolute 0 05 Thousand/uL      Lymphocytes Absolute 2 65 Thousands/µL      Monocytes Absolute 1 48 Thousand/µL      Eosinophils Absolute 0 29 Thousand/µL      Basophils Absolute 0 09 Thousands/µL                  No orders to display              Procedures  ECG 12 Lead Documentation Only    Date/Time: 3/12/2021 11:19 AM  Performed by: Balta Khan PA-C  Authorized by: Balta Khan PA-C     Indications / Diagnosis:  Dizziness  ECG reviewed by me, the ED Provider: yes    Patient location:  ED  Previous ECG:     Previous ECG:  Unavailable  Interpretation:     Interpretation: non-specific    Rate:     ECG rate:  66    ECG rate assessment: normal    Rhythm:     Rhythm: sinus rhythm and A-V block    Ectopy:     Ectopy: none    QRS:     QRS axis:  Left    QRS intervals:  Normal  Conduction:     Conduction: normal    ST segments:     ST segments:  Normal  T waves:     T waves: normal               ED Course                 MDM  Number of Diagnoses or Management Options  Dizziness: new and requires workup  Diagnosis management comments: 69year old male presents emergency department status post episode of room spinning around him dizziness this morning has since resolved  Vitals and medical record reviewed  Patient currently on dual anti-platelet therapy due to recent TIA  Discussed this with Neurology does not feel any additional imaging is required  Laboratory findings significant for minimally elevated WBC and small amount of blood in urine  Otherwise unremarkable  Patient received meclizine  He was able to stand and ambulate without difficulty  Discussed results and findings with patient and spouse  Patient has neurology follow-up  Patient was educated on symptoms that require prompt return to the emergency department for further evaluation and verbalized understanding  Amount and/or Complexity of Data Reviewed  Clinical lab tests: ordered and reviewed  Review and summarize past medical records: yes  Discuss the patient with other providers: yes (Neurology Dr Brianna Masters)        Disposition  Final diagnoses:   Dizziness     Time reflects when diagnosis was documented in both MDM as applicable and the Disposition within this note     Time User Action Codes Description Comment    3/12/2021  1:35 PM Tyler Troncoso Add [R42] Dizziness       ED Disposition     ED Disposition Condition Date/Time Comment    Discharge Stable Fri Mar 12, 2021  1:35 PM Lennie Espinoza discharge to home/self care  Follow-up Information     Follow up With Specialties Details Why [address] IV, MD Family Medicine In 3 days For re-evaluation  You has small amount of blood in the urine and your white blood cell count was minimally elevated  This should be re-checked   David Lerner [address]  [phone]            Discharge Medication List as of 3/12/2021  1:52 PM      START taking these medications    Details   meclizine (ANTIVERT) 25 mg tablet Take 1 tablet (25 mg total) by mouth 3 (three) times a day as needed for dizziness, Starting Fri 3/12/2021, Normal         CONTINUE these medications which have NOT CHANGED    Details   amLODIPine (NORVASC) 5 mg tablet Take 1 tablet (5 mg total) by mouth daily, Starting Tue 3/2/2021, Normal      aspirin 81 mg chewable tablet Chew 1 tablet (81 mg total) daily, Starting Tue 3/2/2021, Normal      atorvastatin (LIPITOR) 40 mg tablet Take 1 tablet (40 mg total) by mouth every evening, Starting Mon 3/1/2021, Normal      clopidogrel (PLAVIX) 75 mg tablet Take 1 tablet (75 mg total) by mouth daily, Starting Tue 3/2/2021, Normal      hydrocortisone 1 % cream Apply topically 2 (two) times a day, Starting Mon 3/1/2021, Normal      hydrOXYzine HCL (ATARAX) 10 mg tablet Take 1 tablet (10 mg total) by mouth every 6 (six) hours as needed for itching, Starting Mon 3/1/2021, Normal      levothyroxine 100 mcg tablet Take by mouth daily Dose unknown, Historical Med      nicotine (NICODERM CQ) 14 mg/24hr TD 24 hr patch Place 1 patch on the skin daily, Starting Tue 3/2/2021, Normal      tamsulosin (FLOMAX) 0 4 mg Take 1 capsule (0 4 mg total) by mouth daily with dinner, Starting Mon 3/1/2021, Normal           No discharge procedures on file      PDMP Review     None          ED Provider  Electronically Signed by           Shanon Muñoz PA-C  03/12/21 Chitra Aguirre [address] MD  03/13/21 3823

## 2021-03-12 NOTE — TELEPHONE ENCOUNTER
Called patient, reached daughter - these is no communication consent form  She states the patient is at the hospital and "is not of sound mind"  I left me number for patient to call back

## 2021-03-25 ENCOUNTER — TELEPHONE (OUTPATIENT)
Dept: CARDIOLOGY CLINIC | Facility: CLINIC | Age: 71
End: 2021-03-25

## 2021-03-25 DIAGNOSIS — I71.2 ASCENDING AORTIC ANEURYSM (HCC): Primary | ICD-10-CM

## 2021-03-25 NOTE — TELEPHONE ENCOUNTER
----- Message from [facility], DO sent at 3/24/2021 11:05 PM EDT -----  Please call the patient and let him know that his MRA showed a stable ascending thoracic aortic aneurysm measuring 4 5 cm which is consistent with findings on CT scan 02/28/2021  Would recommend repeat MRA in 1 year to ensure stability  Thank you

## 2021-04-07 DIAGNOSIS — I10 ESSENTIAL (PRIMARY) HYPERTENSION: ICD-10-CM

## 2021-04-07 DIAGNOSIS — Z86.73 PERSONAL HISTORY OF TRANSIENT ISCHEMIC ATTACK (TIA), AND CEREBRAL INFARCTION WITHOUT RESIDUAL DEFICITS: ICD-10-CM

## 2021-04-07 DIAGNOSIS — I71.2 THORACIC AORTIC ANEURYSM, WITHOUT RUPTURE (HCC): ICD-10-CM

## 2021-04-07 DIAGNOSIS — Z01.810 ENCOUNTER FOR PREPROCEDURAL CARDIOVASCULAR EXAMINATION: ICD-10-CM

## 2021-04-07 DIAGNOSIS — R07.89 OTHER CHEST PAIN: ICD-10-CM

## 2021-06-24 ENCOUNTER — TELEPHONE (OUTPATIENT)
Dept: UROLOGY | Facility: MEDICAL CENTER | Age: 71
End: 2021-06-24

## 2021-06-24 NOTE — TELEPHONE ENCOUNTER
Patient's grand daughter called stating he received a call to reschedule appointment for 07/08 with Dr Andreina Grullon at [facility]  NO appointment available within the appropriate time frame  Please review and call patient with new appointment

## 2021-08-05 NOTE — PROGRESS NOTES
Assessment and plan:     BPH (benign prostatic hyperplasia)  · With urge incontinence  · Recommend avoiding bladder irritants mainly soda  · Increase water intake  · Timed voiding  · continue flomax 0 4 mg daily  · Begin finasteride 5 mg daily  · Follow up 3 months for recheck    Microhematuria  · Schedule US of kidney and bladder  · Schedule cystoscopy    Prostate cancer screening  · PSA 2 weeks, will call with results  · Follow-up 1 year for routine prostate cancer screening        DASHAWN Eckert    History of Present Illness     Ranjith Hein is a 66 y o  New patient presents with his daughter referred for microscopic hematuria  His urine testing completed 03/12/2021 with 2-4 RBCs / hpf; he also had a urine microscopic completed 02/28/2021 with 0-1 RBCs/hpf  He is noted to have an elevated creatinine of 1 3 with a GFR of 56 1  As of 06/17/2021  He does take aspirin and Plavix  He has a 4 pack year smoking history, denies gross hematuria  Denies chemical exposure other than methamphetamines  He reports that he has been having urinary incontinence and urinary urgency for a couple of years that has worsened since having a stroke in February 2021  Drinks "too much soda" and does drink 32 ounces of water per day  Has occasional coffee, denies alcohol (quit that)  He feels his stream "trickles" and just runs down his leg when he tries to urinate  He does sit to urinate and has some constipation, takes a daily laxative  He was a former methamphetamine user  He was started on Flomax 0 4 mg daily in February which he did note some improvement in his urinary stream   He does have intermittent dizzy spells I would not recommend increasing the dosages of this at this time  We will begin finasteride 5 mg daily to try to help improve with the symptoms  When he has his upcoming cystoscopy for microscopic hematuria be able to evaluate his outlet obstruction at that time      Gross hematuria: denies    He does have a history of BPH and takes Flomax 0 4 mg daily  AUA:30    Urine dip: unremarkable    PVR: 29 ml  AUA SYMPTOM SCORE      Most Recent Value   AUA SYMPTOM SCORE   How often have you had a sensation of not emptying your bladder completely after you finished urinating? 5   How often have you had to urinate again less than two hours after you finished urinating? 5   How often have you found you stopped and started again several times when you urinate? 5   How often have you found it difficult to postpone urination? 5   How often have you had a weak urinary stream?  4   How often have you had to push or strain to begin urination? 3   How many times did you most typically get up to urinate from the time you went to bed at night until the time you got up in the morning? 3   Quality of Life: If you were to spend the rest of your life with your urinary condition just the way it is now, how would you feel about that?  6   AUA SYMPTOM SCORE  30          His last PSA was completed in 2018 and was 2 74 at that time  There are no other documented PSAs on file  Will update this      Family history of prostate cancer: denies  Laboratory     Lab Results   Component Value Date    BUN 21 03/12/2021    CREATININE 1 19 03/12/2021       No components found for: GFR    Lab Results   Component Value Date    CALCIUM 8 7 03/12/2021    K 4 2 03/12/2021    CO2 29 03/12/2021     03/12/2021       Lab Results   Component Value Date    WBC 11 78 (H) 03/12/2021    HGB 14 9 03/12/2021    HCT 42 3 03/12/2021    MCV 91 03/12/2021     03/12/2021       No results found for: PSA    Recent Results (from the past 1 hour(s))   POCT urine dip    Collection Time: 08/09/21 10:28 AM   Result Value Ref Range    LEUKOCYTE ESTERASE,UA -     NITRITE,UA -     SL AMB POCT UROBILINOGEN 0 2     POCT URINE PROTEIN -      PH,UA 5 0     BLOOD,UA -     SPECIFIC GRAVITY,UA 1 030     KETONES,UA -     BILIRUBIN,UA -     GLUCOSE, UA -      COLOR,UA yellow     CLARITY,UA clear    POCT Measure PVR    Collection Time: 08/09/21 10:32 AM   Result Value Ref Range    POST-VOID RESIDUAL VOLUME, ML POC 29 mL       @RESULT(URINEMICROSCOPIC)@    @RESULT(URINECULTURE)@    Radiology       Review of Systems     Review of Systems   Constitutional: Negative for activity change, appetite change, chills, fatigue, fever and unexpected weight change  HENT: Negative for facial swelling  Eyes: Negative for discharge  Respiratory: Negative  Negative for cough and shortness of breath  Cardiovascular: Negative for chest pain and leg swelling  Gastrointestinal: Positive for constipation  Negative for abdominal distention, abdominal pain, diarrhea, nausea and vomiting  Endocrine: Negative  Genitourinary: Positive for frequency and urgency  Negative for decreased urine volume, difficulty urinating, dysuria, enuresis, flank pain, genital sores and hematuria  Sensation of incomplete bladder emptying  Urge incontinence   Musculoskeletal: Negative for back pain and myalgias  Skin: Negative for pallor and rash  Allergic/Immunologic: Negative  Negative for immunocompromised state  Neurological: Positive for dizziness  Negative for facial asymmetry and speech difficulty  Psychiatric/Behavioral: Negative for agitation and confusion  Allergies     Allergies   Allergen Reactions    Penicillins Hives    Furacin [Nitrofurazone] Rash       Physical Exam     Physical Exam  Vitals reviewed  Constitutional:       General: He is not in acute distress  Appearance: Normal appearance  He is normal weight  He is not ill-appearing, toxic-appearing or diaphoretic  HENT:      Head: Normocephalic and atraumatic  Eyes:      General: No scleral icterus  Cardiovascular:      Rate and Rhythm: Normal rate  Pulmonary:      Effort: Pulmonary effort is normal  No respiratory distress  Abdominal:      General: Abdomen is flat  There is no distension  Palpations: Abdomen is soft  Tenderness: There is no abdominal tenderness  Hernia: There is no hernia in the left inguinal area or right inguinal area  Genitourinary:     Penis: Circumcised  No hypospadias, erythema, tenderness, discharge, swelling or lesions  Testes:         Right: Mass, tenderness or swelling not present  Right testis is descended  Left: Mass, tenderness or swelling not present  Left testis is descended  Epididymis:      Right: Not inflamed  No tenderness  Left: Not inflamed  No tenderness  Comments: Prostate smooth nontender without appreciable nodules, uniformly firm  Musculoskeletal:         General: No swelling  Cervical back: Normal range of motion  Right lower leg: No edema  Left lower leg: No edema  Lymphadenopathy:      Lower Body: No right inguinal adenopathy  No left inguinal adenopathy  Skin:     General: Skin is warm and dry  Coloration: Skin is not jaundiced or pale  Findings: No rash  Neurological:      General: No focal deficit present  Mental Status: He is alert and oriented to person, place, and time  Gait: Gait normal    Psychiatric:         Mood and Affect: Mood normal          Behavior: Behavior normal          Thought Content:  Thought content normal          Judgment: Judgment normal          Vital Signs     Vitals:    08/09/21 1022   BP: 112/72   Pulse: 66   Weight: 91 6 kg (202 lb)       Current Medications       Current Outpatient Medications:     amLODIPine (NORVASC) 5 mg tablet, Take 1 tablet (5 mg total) by mouth daily, Disp: 30 tablet, Rfl: 0    aspirin 81 mg chewable tablet, Chew 1 tablet (81 mg total) daily, Disp: 30 tablet, Rfl: 0    atorvastatin (LIPITOR) 40 mg tablet, Take 1 tablet (40 mg total) by mouth every evening, Disp: 30 tablet, Rfl: 0    clopidogrel (PLAVIX) 75 mg tablet, Take 1 tablet (75 mg total) by mouth daily, Disp: 21 tablet, Rfl: 0    hydrocortisone 1 % cream, Apply topically 2 (two) times a day, Disp: 30 g, Rfl: 0    hydrOXYzine HCL (ATARAX) 10 mg tablet, Take 1 tablet (10 mg total) by mouth every 6 (six) hours as needed for itching, Disp: 30 tablet, Rfl: 0    levothyroxine 100 mcg tablet, Take by mouth daily Dose unknown, Disp: , Rfl:     meclizine (ANTIVERT) 25 mg tablet, Take 1 tablet (25 mg total) by mouth 3 (three) times a day as needed for dizziness, Disp: 6 tablet, Rfl: 0    tamsulosin (FLOMAX) 0 4 mg, Take 1 capsule (0 4 mg total) by mouth daily with dinner, Disp: 30 capsule, Rfl: 12    finasteride (PROSCAR) 5 mg tablet, Take 1 tablet (5 mg total) by mouth daily, Disp: 30 tablet, Rfl: 3    nicotine (NICODERM CQ) 14 mg/24hr TD 24 hr patch, Place 1 patch on the skin daily (Patient not taking: Reported on 2021), Disp: 28 patch, Rfl: 0    Active Problems     Patient Active Problem List   Diagnosis    Acute CVA [facility]cerebrovascular accident) ([facility] [address] )    Hypothyroidism    Tobacco abuse    Rash    Ascending aortic aneurysm (HCC)    Hypertension    BPH (benign prostatic hyperplasia)    Microhematuria    Prostate cancer screening       Past Medical History     Past Medical History:   Diagnosis Date    Disease of thyroid gland        Surgical History     Past Surgical History:   Procedure Laterality Date    APPENDECTOMY      APPENDECTOMY      HAND TENDON SURGERY  2013    ROTATOR CUFF REPAIR Right        Family History     Family History   Problem Relation Age of Onset    Arthritis Mother     Diabetes Father        Social History     Social History     Social History     Tobacco Use   Smoking Status Former Smoker    Packs/day: 0 50    Years: 9 00    Pack years: 4 50    Types: Cigarettes    Start date:     Quit date:     Years since quittin 6   Smokeless Tobacco Never Used       Past Surgical History:   Procedure Laterality Date    APPENDECTOMY      APPENDECTOMY      HAND TENDON SURGERY  2013    ROTATOR CUFF REPAIR Right          The following portions of the patient's history were reviewed and updated as appropriate: allergies, current medications, past family history, past medical history, past social history, past surgical history and problem list    Please note :  Voice dictation software has been used to create this document  There may be inadvertent transcription errors      [id number] [id number] Carole Colvin

## 2021-08-09 ENCOUNTER — OFFICE VISIT (OUTPATIENT)
Dept: UROLOGY | Facility: CLINIC | Age: 71
End: 2021-08-09
Payer: COMMERCIAL

## 2021-08-09 VITALS
HEART RATE: 66 BPM | SYSTOLIC BLOOD PRESSURE: 112 MMHG | BODY MASS INDEX: 25.94 KG/M2 | WEIGHT: 202 LBS | DIASTOLIC BLOOD PRESSURE: 72 MMHG

## 2021-08-09 DIAGNOSIS — N13.8 BPH WITH OBSTRUCTION/LOWER URINARY TRACT SYMPTOMS: ICD-10-CM

## 2021-08-09 DIAGNOSIS — N40.0 BENIGN PROSTATIC HYPERPLASIA, UNSPECIFIED WHETHER LOWER URINARY TRACT SYMPTOMS PRESENT: ICD-10-CM

## 2021-08-09 DIAGNOSIS — R31.29 MICROHEMATURIA: ICD-10-CM

## 2021-08-09 DIAGNOSIS — N40.1 BPH WITH OBSTRUCTION/LOWER URINARY TRACT SYMPTOMS: ICD-10-CM

## 2021-08-09 DIAGNOSIS — Z12.5 PROSTATE CANCER SCREENING: ICD-10-CM

## 2021-08-09 DIAGNOSIS — N40.1 BENIGN PROSTATIC HYPERPLASIA WITH LOWER URINARY TRACT SYMPTOMS, SYMPTOM DETAILS UNSPECIFIED: Primary | ICD-10-CM

## 2021-08-09 LAB
POST-VOID RESIDUAL VOLUME, ML POC: 29 ML
SL AMB  POCT GLUCOSE, UA: NORMAL
SL AMB LEUKOCYTE ESTERASE,UA: NORMAL
SL AMB POCT BILIRUBIN,UA: NORMAL
SL AMB POCT BLOOD,UA: NORMAL
SL AMB POCT CLARITY,UA: CLEAR
SL AMB POCT COLOR,UA: YELLOW
SL AMB POCT KETONES,UA: NORMAL
SL AMB POCT NITRITE,UA: NORMAL
SL AMB POCT PH,UA: 5
SL AMB POCT SPECIFIC GRAVITY,UA: 1.03
SL AMB POCT URINE PROTEIN: NORMAL
SL AMB POCT UROBILINOGEN: 0.2

## 2021-08-09 PROCEDURE — 99203 OFFICE O/P NEW LOW 30 MIN: CPT | Performed by: NURSE PRACTITIONER

## 2021-08-09 PROCEDURE — 51798 US URINE CAPACITY MEASURE: CPT | Performed by: NURSE PRACTITIONER

## 2021-08-09 PROCEDURE — 81002 URINALYSIS NONAUTO W/O SCOPE: CPT | Performed by: NURSE PRACTITIONER

## 2021-08-09 RX ORDER — FINASTERIDE 5 MG/1
5 TABLET, FILM COATED ORAL DAILY
Qty: 30 TABLET | Refills: 3 | Status: SHIPPED | OUTPATIENT
Start: 2021-08-09 | End: 2021-10-11 | Stop reason: SDUPTHER

## 2021-08-09 RX ORDER — TAMSULOSIN HYDROCHLORIDE 0.4 MG/1
0.4 CAPSULE ORAL
Qty: 30 CAPSULE | Refills: 12 | Status: SHIPPED | OUTPATIENT
Start: 2021-08-09 | End: 2022-04-04 | Stop reason: SDUPTHER

## 2021-08-09 NOTE — ASSESSMENT & PLAN NOTE
· With urge incontinence  · Recommend avoiding bladder irritants mainly soda  · Increase water intake  · Timed voiding  · continue flomax 0 4 mg daily  · Begin finasteride 5 mg daily  · Follow up 3 months for recheck

## 2021-08-09 NOTE — PATIENT INSTRUCTIONS
BLADDER HEALTH    WHAT IS CONSIDERED NORMAL?  The average bladder can hold about 2 cups of urine before it needs to be emptied   The normal range of voiding urine is 6 to 8 times during a 24 hour period  As we get older, our bladder capacity can get smaller and we may need to pass urine more frequently but usually not more than every 2 hours   Urine should flow easily without discomfort in a good, steady stream until the bladder is empty  No pushing or straining is necessary to empty the bladder   An urge is a signal that you feel as the bladder stretches to fill with urine  Urges can be felt even if the bladder is not full  Urges are not commands to go to the toilet, merely a signal and can be controlled  WHAT ARE GOOD BLADDER HABITS?  Take your time when emptying your bladder  Dont strain or push to empty your bladder  Make sure you empty your bladder completely each time you pass urine  Do not rush the process   Consistently ignoring the urge to go (waiting more than 4 hours between toileting) or urinating too infrequently may be convenient but not healthy for your bladder   Avoid going to the toilet just in case or more often than every 2 hours  It is usually not necessary to go when you feel the first urge  Try to go only when your bladder is full  Urgency and frequency of urination can be improved by retraining the bladder and spacing your fluid intake throughout the day  Practice good toilet habits  Dont let your bladder control your life  TIPS TO MAINTAIN GOOD BLADDER HABITS   Maintain a good fluid intake  Depending on your body size and environment, drink 6 -8 cups (8 oz each) of fluid per day unless otherwise advised by your doctor  Not enough fluid creates a foul odor and dark color of the urine     Limit the amount of caffeine (coffee, cola, chocolate or tea) and citrus foods that you consume as these foods can be associated with increased sensation of urinary urgency and frequency   Limit the amount of alcohol you drink  Alcohol increases urine production and makes it difficult for the brain to coordinate bladder control   Avoid constipation by maintaining a balanced diet of dietary fiber   Cigarette smoking is also irritating to the bladder surface and is associated with bladder cancer  In addition, the coughing associated with smoking may lead to increased incontinent episodes because of the increased pressure  HOW DIET CAN AFFECT YOUR BLADDER  Although there is no particular "diet" that can cure bladder control, there are certain dietary suggestions you can use to help control the problem  There are 2 points to consider when evaluating how your habits and diet may affect your bladder:    1  Foods and fluids can irritate the bladder  Some foods and beverages are thought to contribute to bladder leakage and irritability  However their effect on the bladder is not completely understood and you may want to see if eliminating one or all of these items improves your bladder control  If you are unable to give them up completely, it is recommended that you use the following items in moderation:   Acidic beverages and foods (orange juice, grapefruit juice, lemonade etc)   Alcoholic beverages   Vinegar   Coffee (regular and decaf)   Tea (regular and decaf)   Caffeinated beverages   Carbonated beverages          2  Drinking enough and the right kinds of fluids  Many people with bladder control issues decrease their intake of liquids in hope that they will need to urinate less frequently or have less urinary leakage   You should not restrict fluids to control your bladder  While a decrease in liquid intake does result in a decrease in the volume of urine, the smaller amount of urine may be more highly concentrated         Highly concentrated, dark yellow urine is irritating to the bladder surface and may actually cause you to go to the bathroom more frequently   It also encourages the growth of bacteria, which may lead to infections resulting in incontinence   Substitutions for Bladder Irritants: water is always the best beverage choice  Grape and apple juice are thirst quenchers are good selections and are not as irritating to the bladder   o Low acid fruits:  Pears, apricots, papaya, watermelon  o For coffee drinkers: KAVA®, Postum®, Kate®, Kaffree Rosamond®  o For tea drinkers:  non-citrus or herbal and sun brewed tea      Overactive Bladder   AMBULATORY CARE:   Overactive bladder  is a sudden urge to urinate that is difficult for you to control  It occurs when the muscles of the bladder contract (tighten) more than normal  This causes a frequent or sudden need to urinate  You usually have to urinate more than 8 times in 24 hours  You may need to get up more than once in the middle of the night to urinate  You may also leak urine before you are able to make it to the bathroom  Call your doctor if:   · Your urine is pink, or you notice blood in your urine  · You have pain with urination  · You continue to have symptoms even after you take your medicine  · You have questions or concerns about your condition or care  Self-care:   · Train your bladder  Go to the bathroom at set times, such as every 2 hours, even if you do not feel the urge to go  You can also try to hold your urine when you feel the urge to go  For example, hold your urine for 5 minutes when you feel the urge to go  As that becomes easier, hold your urine for 10 minutes  Work up to every 3 or 4 hours to help control your bladder  · Limit liquids as directed  This may help decrease the amount you urinate  Ask how much liquid to drink each day and which liquids are best for you  Avoid liquids several hours before you go to sleep  Limit caffeine and alcohol  · Exercise regularly and maintain a healthy weight    Ask your healthcare provider how much you should weigh and about the best exercise plan for you  Extra weight puts pressure on your bladder and may make your symptoms worse  Ask him or her to help you create a weight loss plan if you are overweight  · Do pelvic muscle exercises often  Your pelvic muscles help you stop urinating  Squeeze these muscles tightly for 5 seconds, then relax for 5 seconds  Gradually work up to squeezing for 10 seconds  Do 3 sets of 15 repetitions a day, or as directed  This will help strengthen your pelvic muscles and improve bladder control  Medicines:   · Medicines  may be given to relax your bladder and decrease urination  · Take your medicine as directed  Contact your healthcare provider if you think your medicine is not helping or if you have side effects  Tell him or her if you are allergic to any medicine  Keep a list of the medicines, vitamins, and herbs you take  Include the amounts, and when and why you take them  Bring the list or the pill bottles to follow-up visits  Carry your medicine list with you in case of an emergency  Follow up with your doctor as directed: If you keep a urination log, take it with you to your follow-up visits  Write down your questions so you remember to ask them during your visits  © Copyright [employer] 2021 Information is for End User's use only and may not be sold, redistributed or otherwise used for commercial purposes  All illustrations and images included in CareNotes® are the copyrighted property of A [employer] M [employer]  or Nighat Fernandes  The above information is an  only  It is not intended as medical advice for individual conditions or treatments  Talk to your doctor, nurse or pharmacist before following any medical regimen to see if it is safe and effective for you  Enlarged Prostate (BPH)   WHAT YOU NEED TO KNOW:   An enlarged prostate (BPH) develops because the number of prostate cells increases (hyperplasia) or the cells get bigger (hypertrophy)   BPH causes urinary system problems that can get worse over time  You can work with healthcare providers and take steps to manage BPH  DISCHARGE INSTRUCTIONS:   Call your doctor or urologist if:   · You see blood in your urine  · You are not able to urinate  · Your bladder feels very full and painful  · You have new or worsening symptoms  · You have a fever  · You have questions or concerns about your condition or care  Medicines: You may need any of the following:  · Medicines  may be used to relax the muscles in your prostate and bladder  This may help you urinate more easily  Medicines may also be used to block the production of a hormone that causes the prostate to get larger  It may help to slow the growth of the prostate or shrink the prostate  · Diuretics  (water pills) may be used to relieve fluid buildup  You will need to take these in the morning or afternoon because they may cause you to urinate more often  Do not take them before bedtime  · Take your medicine as directed  Contact your healthcare provider if you think your medicine is not helping or if you have side effects  Tell him or her if you are allergic to any medicine  Keep a list of the medicines, vitamins, and herbs you take  Include the amounts, and when and why you take them  Bring the list or the pill bottles to follow-up visits  Carry your medicine list with you in case of an emergency  What you can do to manage BPH:   · Urinate on a regular schedule  This will train your bladder to hold urine longer  A larger amount of urine may make it easier to urinate  · Talk to your healthcare provider about all your medicines  This includes prescription and over-the-counter medicines  Some medicines can make BPH worse  Do not start any new medicines before you talk to your provider  · Drink less liquid during the day  Do not have liquid for several hours before you go to bed at night   Do not drink large amounts of any liquid at one time  · Limit alcohol and caffeine  These can irritate your bladder and make your symptoms worse  · Eat less salt  Salt can cause fluid buildup and make it harder to urinate  Examples of salty foods are chips, cured meats, and canned soups  Do not use table salt  · Elevate your legs if you have swelling  Elevate (raise) your legs above the level of your heart  This can relieve swelling caused by fluid buildup  You may not have to get up in the night to urinate  · Lose weight if you are overweight  Obesity increases your risk for obstructive sleep apnea (DAVID)  DAVID can make you need to get up in the night to urinate  Exercise can help you reach or maintain a healthy weight  A lack of exercise may make BPH symptoms worse  Aim to get at least 30 minutes of exercise on most days of the week  Follow up with your doctor or urologist as directed:  Write down your questions so you remember to ask them during your visits  © Copyright [employer] 2021 Information is for End User's use only and may not be sold, redistributed or otherwise used for commercial purposes  All illustrations and images included in CareNotes® are the copyrighted property of [employer] M [employer] Inc  or [employer]   The above information is an  only  It is not intended as medical advice for individual conditions or treatments  Talk to your doctor, nurse or pharmacist before following any medical regimen to see if it is safe and effective for you  Cystoscopy   AMBULATORY CARE:   A cystoscopy  is a procedure to look inside of your urethra and bladder using a cystoscope  A cystoscope is a small tube with a light and magnifying camera on the end  The procedure is used to diagnose and treat conditions of the bladder, urethra, and prostate  The procedure is also done to remove stones or blood clots from the urethra or bladder   Your healthcare provider may do other tests, such as ureteroscopy, during a cystoscopy  Prepare for your cystoscopy: You may need to stop smoking several days before your procedure, if you are having general anesthesia  Tell your healthcare provider what medicines you take  Your healthcare provider will tell you what medicines to take and not to take on the day of your procedure  You may need to stop taking medicines such as anticoagulants, aspirin, and ibuprofen several days before your procedure  He may tell you stop eating after midnight the night before your procedure  You may be asked to drink a large amount of liquids before your procedure  Make plans for someone to drive you home after your procedure  During your cystoscopy:   · You may be given general anesthesia to keep you asleep and pain free during your procedure  Your healthcare provider may give you anesthesia in your spine  With spinal anesthesia the lower part of your body will be numb  You will not feel pain during your procedure  Your healthcare provider may instead use local anesthesia that is put into your urethra and bladder  You will not feel pain, but you may be able to feel some pressure during your procedure  With local anesthesia, you may feel burning or need to urinate when the cystoscope is put in and removed  · You will be placed on your back and your feet may be placed in stirrups  The cystoscope will be will be placed through your urethra and into your bladder  The urologist will look at the walls of your urethra as the scope goes through to your bladder  Your bladder may be filled with an irrigation liquid to help your urologist see inside of your bladder more clearly   Special tools may used to remove tissue or stones  Your urologist may use a special tool to stop bleeding in your bladder  If there are blood clots in your bladder, your healthcare provider will inject an irrigation fluid into your bladder  Then he will use suction to remove the fluid and blood clots    After a cystoscopy:  After you are fully awake, you will go home  After your cystoscopy, it is normal to have pink-colored urine  It is also normal to have an increased need to urinate  You may have burning when you urinate  If you had general anesthesia, it may take at least 24 hours before you feel like your usual self  Risks of a cystoscopy: You may bleed more than expected or develop an infection  Swelling caused by the cystoscopy may cause a blockage or slow urine flow  Seek care immediately if:   · Your urine turns from pink to red, or you have clots in your urine  · You cannot urinate and your bladder feels full  · Your pain or burning becomes worse or lasts longer than 2 days  Contact your healthcare provider or urologist if:   · Your urine stays pink for longer than 3 days  · Your pain or burning becomes worse  · Your skin is itchy, swollen, or has a new rash  · You have a fever and chills  · You have questions or concerns about your condition or care  After your cystoscopy: It is normal to have pink-colored urine  It is also normal to have an increased need to urinate and burning when you urinate  If you had general anesthesia, it may take at least 24 hours before you feel like your usual self  · Drink at least 3 to 4 glasses of water daily for 2 days after your procedure  Avoid acidic juices such as orange juice and lemonade  Water can help prevent blood clots from forming  It can also help decrease the amount of acid in your urine that can cause burning  · Sit in a warm tub of water  Warm baths relieve pain and bladder spasms  · Do not have sex  until your healthcare provider tells you it is okay  Sex may increase your risk for a urinary tract infection  Medicines: You may  be given any of the following:  · Antibiotics  help treat or prevent a bacterial infection  · Acetaminophen  decreases pain and fever  It is available without a doctor's order  Ask how much to take and how often to take it  Follow directions  Read the labels of all other medicines you are using to see if they also contain acetaminophen, or ask your doctor or pharmacist  Acetaminophen can cause liver damage if not taken correctly  Do not use more than 4 grams (4,000 milligrams) total of acetaminophen in one day  · Take your medicine as directed  Contact your healthcare provider if you think your medicine is not helping or if you have side effects  Tell him or her if you are allergic to any medicine  Keep a list of the medicines, vitamins, and herbs you take  Include the amounts, and when and why you take them  Bring the list or the pill bottles to follow-up visits  Carry your medicine list with you in case of an emergency  Follow up with your healthcare provider as directed: You may need to have another cystoscopy  Write down your questions so you remember to ask them during your visits  © 2017 [address] [employer] Information is for End User's use only and may not be sold, redistributed or otherwise used for commercial purposes  All illustrations and images included in CareNotes® are the copyrighted property of A D A M , Inc  or Kayode Zimmerman  The above information is an  only  It is not intended as medical advice for individual conditions or treatments  Talk to your doctor, nurse or pharmacist before following any medical regimen to see if it is safe and effective for you

## 2021-08-10 ENCOUNTER — HOSPITAL ENCOUNTER (OUTPATIENT)
Dept: ULTRASOUND IMAGING | Facility: HOSPITAL | Age: 71
Discharge: HOME | End: 2021-08-10
Payer: COMMERCIAL

## 2021-08-10 DIAGNOSIS — R31.29 MICROHEMATURIA: ICD-10-CM

## 2021-08-10 PROCEDURE — 76770 US EXAM ABDO BACK WALL COMP: CPT

## 2021-08-12 ENCOUNTER — TELEPHONE (OUTPATIENT)
Dept: UROLOGY | Facility: CLINIC | Age: 71
End: 2021-08-12

## 2021-08-12 NOTE — RESULT ENCOUNTER NOTE
Please let patient know that his ultrasound kidney and bladder did not reveal any kidney stones or swelling of the kidneys  He does have a simple cyst in the left kidney which is a benign finding  His bladder was not well visualized due to not being full    We will evaluate this further at his upcoming cystoscopy

## 2021-08-12 NOTE — TELEPHONE ENCOUNTER
Called and spoke with patients granddaughter regarding imaging results  She verbalized understanding and will pass on information to patient

## 2021-10-11 ENCOUNTER — PROCEDURE VISIT (OUTPATIENT)
Dept: UROLOGY | Facility: CLINIC | Age: 71
End: 2021-10-11
Payer: COMMERCIAL

## 2021-10-11 VITALS
HEART RATE: 68 BPM | WEIGHT: 202 LBS | DIASTOLIC BLOOD PRESSURE: 76 MMHG | SYSTOLIC BLOOD PRESSURE: 122 MMHG | BODY MASS INDEX: 25.94 KG/M2

## 2021-10-11 DIAGNOSIS — R31.29 MICROHEMATURIA: Primary | ICD-10-CM

## 2021-10-11 DIAGNOSIS — Z12.5 PROSTATE CANCER SCREENING: ICD-10-CM

## 2021-10-11 DIAGNOSIS — N40.1 BENIGN PROSTATIC HYPERPLASIA WITH LOWER URINARY TRACT SYMPTOMS, SYMPTOM DETAILS UNSPECIFIED: ICD-10-CM

## 2021-10-11 PROCEDURE — 99213 OFFICE O/P EST LOW 20 MIN: CPT | Performed by: UROLOGY

## 2021-10-11 PROCEDURE — 52000 CYSTOURETHROSCOPY: CPT | Performed by: UROLOGY

## 2021-10-11 RX ORDER — CIPROFLOXACIN 500 MG/1
500 TABLET, FILM COATED ORAL ONCE
Qty: 1 TABLET | Refills: 0 | Status: SHIPPED | OUTPATIENT
Start: 2021-10-11 | End: 2021-10-11

## 2021-10-11 RX ORDER — FINASTERIDE 5 MG/1
5 TABLET, FILM COATED ORAL DAILY
Qty: 90 TABLET | Refills: 3 | Status: SHIPPED | OUTPATIENT
Start: 2021-10-11 | End: 2022-02-03 | Stop reason: SDUPTHER

## 2021-11-09 ENCOUNTER — TELEPHONE (OUTPATIENT)
Dept: OTHER | Facility: OTHER | Age: 71
End: 2021-11-09

## 2021-11-09 DIAGNOSIS — N40.1 BENIGN PROSTATIC HYPERPLASIA (BPH) WITH URINARY URGE INCONTINENCE: Primary | ICD-10-CM

## 2021-11-09 DIAGNOSIS — N39.41 BENIGN PROSTATIC HYPERPLASIA (BPH) WITH URINARY URGE INCONTINENCE: Primary | ICD-10-CM

## 2021-11-11 ENCOUNTER — TELEPHONE (OUTPATIENT)
Dept: OTHER | Facility: OTHER | Age: 71
End: 2021-11-11

## 2022-02-03 DIAGNOSIS — N40.1 BENIGN PROSTATIC HYPERPLASIA WITH LOWER URINARY TRACT SYMPTOMS, SYMPTOM DETAILS UNSPECIFIED: ICD-10-CM

## 2022-02-03 RX ORDER — FINASTERIDE 5 MG/1
5 TABLET, FILM COATED ORAL DAILY
Qty: 30 TABLET | Refills: 6 | Status: SHIPPED | OUTPATIENT
Start: 2022-02-03 | End: 2022-02-03

## 2022-02-03 RX ORDER — FINASTERIDE 5 MG/1
5 TABLET, FILM COATED ORAL DAILY
Qty: 14 TABLET | Refills: 0 | Status: SHIPPED | OUTPATIENT
Start: 2022-02-03 | End: 2022-04-04 | Stop reason: SDUPTHER

## 2022-02-03 NOTE — TELEPHONE ENCOUNTER
Patient's granddaughter is requesting a 14 day supply because her grandfather is out of this medication  Once this medication is picked up his granddaughter will call us back and order a 90 day supply through the mail order pharmacy

## 2022-03-11 DIAGNOSIS — N40.1 BENIGN PROSTATIC HYPERPLASIA WITH LOWER URINARY TRACT SYMPTOMS, SYMPTOM DETAILS UNSPECIFIED: ICD-10-CM

## 2022-03-11 NOTE — TELEPHONE ENCOUNTER
Attempted to contact patient  Call was answered, but immediately disconnected  Tried calling number back and it is going right to voicemail  Will  try back later

## 2022-03-14 RX ORDER — FINASTERIDE 5 MG/1
5 TABLET, FILM COATED ORAL DAILY
Qty: 14 TABLET | Refills: 0 | OUTPATIENT
Start: 2022-03-14

## 2022-03-14 NOTE — TELEPHONE ENCOUNTER
Call placed to patient and spoke with his granddaughter  She informed me that the 14 day supply was to hold him over until mail order script arrived  Everything has been taken care of according to granddaughter and no further action is required

## 2022-04-04 ENCOUNTER — NURSE TRIAGE (OUTPATIENT)
Dept: OTHER | Facility: OTHER | Age: 72
End: 2022-04-04

## 2022-04-04 DIAGNOSIS — N40.1 BENIGN PROSTATIC HYPERPLASIA WITH LOWER URINARY TRACT SYMPTOMS, SYMPTOM DETAILS UNSPECIFIED: ICD-10-CM

## 2022-04-04 DIAGNOSIS — N40.0 BENIGN PROSTATIC HYPERPLASIA, UNSPECIFIED WHETHER LOWER URINARY TRACT SYMPTOMS PRESENT: ICD-10-CM

## 2022-04-04 RX ORDER — FINASTERIDE 5 MG/1
5 TABLET, FILM COATED ORAL DAILY
Qty: 30 TABLET | Refills: 0 | Status: SHIPPED | OUTPATIENT
Start: 2022-04-04 | End: 2022-04-11 | Stop reason: SDUPTHER

## 2022-04-04 RX ORDER — FINASTERIDE 5 MG/1
5 TABLET, FILM COATED ORAL DAILY
Qty: 14 TABLET | Refills: 0 | OUTPATIENT
Start: 2022-04-04

## 2022-04-04 RX ORDER — TAMSULOSIN HYDROCHLORIDE 0.4 MG/1
0.4 CAPSULE ORAL
Qty: 30 CAPSULE | Refills: 12 | Status: SHIPPED | OUTPATIENT
Start: 2022-04-04 | End: 2022-04-11 | Stop reason: SDUPTHER

## 2022-04-04 RX ORDER — FINASTERIDE 5 MG/1
5 TABLET, FILM COATED ORAL DAILY
Qty: 90 TABLET | Refills: 3 | Status: CANCELLED | OUTPATIENT
Start: 2022-04-04 | End: 2022-07-03

## 2022-04-04 RX ORDER — TAMSULOSIN HYDROCHLORIDE 0.4 MG/1
0.4 CAPSULE ORAL
Qty: 90 CAPSULE | Refills: 3 | Status: CANCELLED | OUTPATIENT
Start: 2022-04-04 | End: 2022-07-03

## 2022-04-04 NOTE — TELEPHONE ENCOUNTER
Call placed to patient and spoke with the granddaughter  She informed me that mail order never shipped and there was a delay on their part  She spoke to mail  order pharmacy and they are sending out medication this week  Pt is out of medication and granddaughter is asking if 14 day supply can be sent to [facility] to hold him over until mail order arrived hopefully next week

## 2022-04-04 NOTE — TELEPHONE ENCOUNTER
Reason for Disposition   [1] Caller has NON-URGENT medicine question about med that PCP prescribed AND [2] triager unable to answer question    Answer Assessment - Initial Assessment Questions  1  DRUG NAME: "What medicine do you need to have refilled?"      Proscar  2  REFILLS REMAINING: "How many refills are remaining?" (Note: The label on the medicine or pill bottle will show how many refills are remaining  If there are no refills remaining, then a renewal may be needed )      0  3  EXPIRATION DATE: "What is the expiration date?" (Note: The label states when the prescription will , and thus can no longer be refilled )     0  4  PRESCRIBING HCP: "Who prescribed it?" Reason: If prescribed by specialist, call should be referred to that group        urology    Protocols used: MEDICATION REFILL AND RENEWAL CALL-ADULT-

## 2022-04-04 NOTE — TELEPHONE ENCOUNTER
Regarding: Out of medication  ----- Message from Edda Richard sent at 4/4/2022  8:54 AM EDT -----  Patient is completely out of his finasteride (PROSCAR) 5 mg tablet  He will need a short supply sent to the local [facility], until he can receive a refill from his mail service pharmacy      [id number] [id number], [address] [place] [id number]   Phone:  [phone]  Fax:  [phone]

## 2022-04-11 ENCOUNTER — OFFICE VISIT (OUTPATIENT)
Dept: UROLOGY | Facility: CLINIC | Age: 72
End: 2022-04-11
Payer: COMMERCIAL

## 2022-04-11 ENCOUNTER — APPOINTMENT (OUTPATIENT)
Dept: LAB | Facility: MEDICAL CENTER | Age: 72
End: 2022-04-11
Payer: COMMERCIAL

## 2022-04-11 VITALS
HEART RATE: 72 BPM | DIASTOLIC BLOOD PRESSURE: 80 MMHG | WEIGHT: 203 LBS | BODY MASS INDEX: 26.06 KG/M2 | SYSTOLIC BLOOD PRESSURE: 114 MMHG

## 2022-04-11 DIAGNOSIS — Z12.5 PROSTATE CANCER SCREENING: ICD-10-CM

## 2022-04-11 DIAGNOSIS — R31.29 MICROHEMATURIA: ICD-10-CM

## 2022-04-11 DIAGNOSIS — N39.41 BENIGN PROSTATIC HYPERPLASIA (BPH) WITH URINARY URGE INCONTINENCE: ICD-10-CM

## 2022-04-11 DIAGNOSIS — N40.1 BENIGN PROSTATIC HYPERPLASIA (BPH) WITH URINARY URGE INCONTINENCE: ICD-10-CM

## 2022-04-11 DIAGNOSIS — N40.1 BENIGN PROSTATIC HYPERPLASIA WITH LOWER URINARY TRACT SYMPTOMS, SYMPTOM DETAILS UNSPECIFIED: ICD-10-CM

## 2022-04-11 DIAGNOSIS — N40.0 BENIGN PROSTATIC HYPERPLASIA, UNSPECIFIED WHETHER LOWER URINARY TRACT SYMPTOMS PRESENT: Primary | ICD-10-CM

## 2022-04-11 LAB
POST-VOID RESIDUAL VOLUME, ML POC: 35 ML
PSA SERPL-MCNC: 2.1 NG/ML (ref 0–4)
SL AMB  POCT GLUCOSE, UA: NORMAL
SL AMB LEUKOCYTE ESTERASE,UA: NORMAL
SL AMB POCT BILIRUBIN,UA: NORMAL
SL AMB POCT BLOOD,UA: NORMAL
SL AMB POCT CLARITY,UA: CLEAR
SL AMB POCT COLOR,UA: YELLOW
SL AMB POCT KETONES,UA: NORMAL
SL AMB POCT NITRITE,UA: NORMAL
SL AMB POCT PH,UA: 5
SL AMB POCT SPECIFIC GRAVITY,UA: 1.03
SL AMB POCT URINE PROTEIN: NORMAL
SL AMB POCT UROBILINOGEN: 0.2

## 2022-04-11 PROCEDURE — G0103 PSA SCREENING: HCPCS

## 2022-04-11 PROCEDURE — 99213 OFFICE O/P EST LOW 20 MIN: CPT | Performed by: NURSE PRACTITIONER

## 2022-04-11 PROCEDURE — 51798 US URINE CAPACITY MEASURE: CPT | Performed by: NURSE PRACTITIONER

## 2022-04-11 PROCEDURE — 81002 URINALYSIS NONAUTO W/O SCOPE: CPT | Performed by: NURSE PRACTITIONER

## 2022-04-11 PROCEDURE — 36415 COLL VENOUS BLD VENIPUNCTURE: CPT

## 2022-04-11 RX ORDER — TAMSULOSIN HYDROCHLORIDE 0.4 MG/1
0.4 CAPSULE ORAL
Qty: 90 CAPSULE | Refills: 3 | Status: SHIPPED | OUTPATIENT
Start: 2022-04-11 | End: 2022-05-09 | Stop reason: SDUPTHER

## 2022-04-11 RX ORDER — FINASTERIDE 5 MG/1
5 TABLET, FILM COATED ORAL DAILY
Qty: 90 TABLET | Refills: 3 | Status: SHIPPED | OUTPATIENT
Start: 2022-04-11

## 2022-04-11 NOTE — PATIENT INSTRUCTIONS
Take your Flomax 0 4 mg daily, refill was sent to your mail order pharmacy  Take finasteride 5 mg daily, refill was sent your mail order pharmacy  Decreased soda intake  Increased water intake  Follow-up 1 year  Have your PSA blood test done now   Call you have any worsening urinary symptoms or concerns

## 2022-04-11 NOTE — PROGRESS NOTES
Assessment and plan:     Prostate cancer screening  · PSA ordered in August but not completed  · PSA now  · Follow-up 1 year routine prostate cancer screening    Microhematuria  · Ultrasound kidney and bladder unremarkable  · Cystoscopy unremarkable other than trilobar hypertrophy, 10/11/2021  · Follow-up 1 year    Benign prostatic hyperplasia (BPH) with urinary urge incontinence  · Continue Flomax 0 4 mg daily  · Continue finasteride 5 mg daily  · Declined pelvic floor PT  · Avoid bladder irritants, mainly soda  · Increased water intake  · Follow up 1 year        DASHAWN Lovett    History of Present Illness     Barak Garcia is a 66 y o  male who presents for follow-up appointment after initiating Flomax August 2021  He was also referred for microscopic hematuria and underwent a cystoscopy 10/11/2021 at which time he was noted to have trilobar hypertrophy  He was started on Proscar prior to that but the prescription ran out and was discontinued  He returns today for a six-month follow-up to evaluate his symptoms  He ran out of his finasteride a week ago  A refill was sent to his local pharmacy however he was waiting for a mail delivery  Will refill the medication at this time  He has nocturia 1-2 times  He drinks mostly soda and about 24 oz of water a day  Recommend increase water intake and avoid bladder irritants and continue Kegel exercises  Consider TURP or Rezum procedure if he has no improvement of symptoms  He is happy with his symptoms at this time and would like to continue the medication  He is not using any pads      An ultrasound kidney and bladder that was normal     Laboratory     Lab Results   Component Value Date    BUN 21 03/12/2021    CREATININE 1 19 03/12/2021       No components found for: GFR    Lab Results   Component Value Date    CALCIUM 8 7 03/12/2021    K 4 2 03/12/2021    CO2 29 03/12/2021     03/12/2021       Lab Results   Component Value Date    WBC 11 78 (H) 03/12/2021    HGB 14 9 03/12/2021    HCT 42 3 03/12/2021    MCV 91 03/12/2021     03/12/2021       No results found for: PSA    Recent Results (from the past 1 hour(s))   POCT urine dip    Collection Time: 04/11/22 10:01 AM   Result Value Ref Range    LEUKOCYTE ESTERASE,UA -     NITRITE,UA -     SL AMB POCT UROBILINOGEN 0 2     POCT URINE PROTEIN -      PH,UA 5 0     BLOOD,UA trace     SPECIFIC GRAVITY,UA 1 030     KETONES,UA -     BILIRUBIN,UA -     GLUCOSE, UA -      COLOR,UA yellow     CLARITY,UA clear    POCT Measure PVR    Collection Time: 04/11/22 10:01 AM   Result Value Ref Range    POST-VOID RESIDUAL VOLUME, ML POC 35 mL       @RESULT(URINEMICROSCOPIC)@    @RESULT(URINECULTURE)@    Radiology       Review of Systems     Review of Systems   Constitutional: Negative for activity change, appetite change, chills, fatigue, fever and unexpected weight change  HENT: Negative for facial swelling  Eyes: Negative for discharge  Respiratory: Negative  Negative for cough and shortness of breath  Cardiovascular: Negative for chest pain and leg swelling  Gastrointestinal: Negative  Negative for abdominal distention, abdominal pain, constipation, diarrhea, nausea and vomiting  Endocrine: Negative  Genitourinary: Negative  Negative for decreased urine volume, difficulty urinating, dysuria, enuresis, flank pain, frequency, genital sores, hematuria and urgency  Nocturia x 1-2   Musculoskeletal: Negative for back pain and myalgias  Skin: Negative for pallor and rash  Allergic/Immunologic: Negative  Negative for immunocompromised state  Neurological: Negative for facial asymmetry and speech difficulty  Psychiatric/Behavioral: Negative for agitation and confusion  Allergies     Allergies   Allergen Reactions    Penicillins Hives    Furacin [Nitrofurazone] Rash       Physical Exam     Physical Exam  Vitals reviewed  Constitutional:       General: He is not in acute distress  Appearance: Normal appearance  He is normal weight  He is not ill-appearing, toxic-appearing or diaphoretic  HENT:      Head: Normocephalic and atraumatic  Eyes:      General: No scleral icterus  Cardiovascular:      Rate and Rhythm: Normal rate  Pulmonary:      Effort: Pulmonary effort is normal  No respiratory distress  Abdominal:      General: Abdomen is flat  There is no distension  Palpations: Abdomen is soft  Tenderness: There is no abdominal tenderness  Musculoskeletal:         General: No swelling  Cervical back: Normal range of motion  Skin:     General: Skin is warm and dry  Coloration: Skin is not jaundiced or pale  Findings: No rash  Neurological:      General: No focal deficit present  Mental Status: He is alert and oriented to person, place, and time  Gait: Gait normal    Psychiatric:         Mood and Affect: Mood normal          Behavior: Behavior normal          Thought Content:  Thought content normal          Judgment: Judgment normal          Vital Signs     Vitals:    04/11/22 0955   BP: 114/80   Pulse: 72   Weight: 92 1 kg (203 lb)       Current Medications       Current Outpatient Medications:     amLODIPine (NORVASC) 5 mg tablet, Take 1 tablet (5 mg total) by mouth daily, Disp: 30 tablet, Rfl: 0    aspirin 81 mg chewable tablet, Chew 1 tablet (81 mg total) daily, Disp: 30 tablet, Rfl: 0    atorvastatin (LIPITOR) 40 mg tablet, Take 1 tablet (40 mg total) by mouth every evening, Disp: 30 tablet, Rfl: 0    clopidogrel (PLAVIX) 75 mg tablet, Take 1 tablet (75 mg total) by mouth daily, Disp: 21 tablet, Rfl: 0    finasteride (PROSCAR) 5 mg tablet, Take 1 tablet (5 mg total) by mouth daily, Disp: 90 tablet, Rfl: 3    hydrocortisone 1 % cream, Apply topically 2 (two) times a day, Disp: 30 g, Rfl: 0    hydrOXYzine HCL (ATARAX) 10 mg tablet, Take 1 tablet (10 mg total) by mouth every 6 (six) hours as needed for itching, Disp: 30 tablet, Rfl: 0   levothyroxine 100 mcg tablet, Take by mouth daily Dose unknown, Disp: , Rfl:     meclizine (ANTIVERT) 25 mg tablet, Take 1 tablet (25 mg total) by mouth 3 (three) times a day as needed for dizziness, Disp: 6 tablet, Rfl: 0    nicotine (NICODERM CQ) 14 mg/24hr TD 24 hr patch, Place 1 patch on the skin daily, Disp: 28 patch, Rfl: 0    tamsulosin (FLOMAX) 0 4 mg, Take 1 capsule (0 4 mg total) by mouth daily with dinner, Disp: 90 capsule, Rfl: 3    Active Problems     Patient Active Problem List   Diagnosis    [facility]cerebro[facility]) [facility] [address] [facility]    Hypothyroidism    Tobacco abuse    Rash    Ascending aortic aneurysm ([facility] )    Hypertension    Benign prostatic hyperplasia (BPH) with urinary urge incontinence    Microhematuria    Prostate cancer screening       Past Medical History     Past Medical History:   Diagnosis Date    Disease of thyroid gland        Surgical History     Past Surgical History:   Procedure Laterality Date    APPENDECTOMY      APPENDECTOMY      HAND TENDON SURGERY  2013    ROTATOR CUFF REPAIR Right        Family History     Family History   Problem Relation Age of Onset    Arthritis Mother     Diabetes Father        Social History     Social History     Social History     Tobacco Use   Smoking Status Current Every Day Smoker    Packs/day: 0 50    Years: 9 00    Pack years: 4 50    Types: Cigarettes    Start date:     Last attempt to quit:     Years since quittin 2   Smokeless Tobacco Never Used       Past Surgical History:   Procedure Laterality Date    APPENDECTOMY      APPENDECTOMY      HAND TENDON SURGERY  2013    ROTATOR CUFF REPAIR Right          The following portions of the patient's history were reviewed and updated as appropriate: allergies, current medications, past family history, past medical history, past social history, past surgical history and problem list    Please note :  Voice dictation software has been used to create this document  There may be inadvertent transcription errors      [id number] [id number] Elba Baird

## 2022-04-11 NOTE — ASSESSMENT & PLAN NOTE
· Continue Flomax 0 4 mg daily  · Continue finasteride 5 mg daily  · Declined pelvic floor PT  · Avoid bladder irritants, mainly soda  · Increased water intake  · Follow up 1 year

## 2022-04-11 NOTE — ASSESSMENT & PLAN NOTE
· Ultrasound kidney and bladder unremarkable  · Cystoscopy unremarkable other than trilobar hypertrophy, 10/11/2021  · Follow-up 1 year

## 2022-04-11 NOTE — ASSESSMENT & PLAN NOTE
· PSA ordered in August but not completed  · PSA now  · Follow-up 1 year routine prostate cancer screening

## 2022-04-12 ENCOUNTER — TELEPHONE (OUTPATIENT)
Dept: UROLOGY | Facility: CLINIC | Age: 72
End: 2022-04-12

## 2022-04-12 NOTE — TELEPHONE ENCOUNTER
Voicemail message left per communication consent with normal PSA results and to have PSA repeated in 1 year

## 2022-04-29 ENCOUNTER — HOSPITAL ENCOUNTER (EMERGENCY)
Facility: HOSPITAL | Age: 72
Discharge: HOME | End: 2022-04-29
Attending: EMERGENCY MEDICINE | Admitting: EMERGENCY MEDICINE
Payer: COMMERCIAL

## 2022-04-29 ENCOUNTER — APPOINTMENT (EMERGENCY)
Dept: RADIOLOGY | Facility: HOSPITAL | Age: 72
End: 2022-04-29
Payer: COMMERCIAL

## 2022-04-29 VITALS
HEIGHT: 74 IN | HEART RATE: 61 BPM | DIASTOLIC BLOOD PRESSURE: 76 MMHG | WEIGHT: 202.82 LBS | OXYGEN SATURATION: 97 % | TEMPERATURE: 97.1 F | BODY MASS INDEX: 26.03 KG/M2 | RESPIRATION RATE: 21 BRPM | SYSTOLIC BLOOD PRESSURE: 112 MMHG

## 2022-04-29 DIAGNOSIS — R07.89 ATYPICAL CHEST PAIN: Primary | ICD-10-CM

## 2022-04-29 DIAGNOSIS — F17.200 SMOKING: ICD-10-CM

## 2022-04-29 LAB
ALBUMIN SERPL BCP-MCNC: 3.9 G/DL (ref 3.5–5)
ALP SERPL-CCNC: 97 U/L (ref 46–116)
ALT SERPL W P-5'-P-CCNC: 34 U/L (ref 12–78)
ANION GAP SERPL CALCULATED.3IONS-SCNC: 6 MMOL/L (ref 4–13)
AST SERPL W P-5'-P-CCNC: 19 U/L (ref 5–45)
BASOPHILS # BLD AUTO: 0.09 THOUSANDS/ΜL (ref 0–0.1)
BASOPHILS NFR BLD AUTO: 1 % (ref 0–1)
BILIRUB SERPL-MCNC: 0.68 MG/DL (ref 0.2–1)
BUN SERPL-MCNC: 20 MG/DL (ref 5–25)
CALCIUM SERPL-MCNC: 8.8 MG/DL (ref 8.3–10.1)
CARDIAC TROPONIN I PNL SERPL HS: 6 NG/L
CHLORIDE SERPL-SCNC: 104 MMOL/L (ref 100–108)
CO2 SERPL-SCNC: 29 MMOL/L (ref 21–32)
CREAT SERPL-MCNC: 1.3 MG/DL (ref 0.6–1.3)
D DIMER PPP FEU-MCNC: 0.39 UG/ML FEU
EOSINOPHIL # BLD AUTO: 0.27 THOUSAND/ΜL (ref 0–0.61)
EOSINOPHIL NFR BLD AUTO: 2 % (ref 0–6)
ERYTHROCYTE [DISTWIDTH] IN BLOOD BY AUTOMATED COUNT: 13.3 % (ref 11.6–15.1)
GFR SERPL CREATININE-BSD FRML MDRD: 54 ML/MIN/1.73SQ M
GLUCOSE SERPL-MCNC: 83 MG/DL (ref 65–140)
HCT VFR BLD AUTO: 46.7 % (ref 36.5–49.3)
HGB BLD-MCNC: 15.6 G/DL (ref 12–17)
IMM GRANULOCYTES # BLD AUTO: 0.06 THOUSAND/UL (ref 0–0.2)
IMM GRANULOCYTES NFR BLD AUTO: 1 % (ref 0–2)
LIPASE SERPL-CCNC: 101 U/L (ref 73–393)
LYMPHOCYTES # BLD AUTO: 2.47 THOUSANDS/ΜL (ref 0.6–4.47)
LYMPHOCYTES NFR BLD AUTO: 20 % (ref 14–44)
MAGNESIUM SERPL-MCNC: 1.9 MG/DL (ref 1.6–2.6)
MCH RBC QN AUTO: 30.1 PG (ref 26.8–34.3)
MCHC RBC AUTO-ENTMCNC: 33.4 G/DL (ref 31.4–37.4)
MCV RBC AUTO: 90 FL (ref 82–98)
MONOCYTES # BLD AUTO: 1.56 THOUSAND/ΜL (ref 0.17–1.22)
MONOCYTES NFR BLD AUTO: 13 % (ref 4–12)
NEUTROPHILS # BLD AUTO: 7.85 THOUSANDS/ΜL (ref 1.85–7.62)
NEUTS SEG NFR BLD AUTO: 63 % (ref 43–75)
NRBC BLD AUTO-RTO: 0 /100 WBCS
NT-PROBNP SERPL-MCNC: 268 PG/ML
PLATELET # BLD AUTO: 264 THOUSANDS/UL (ref 149–390)
PMV BLD AUTO: 9.6 FL (ref 8.9–12.7)
POTASSIUM SERPL-SCNC: 4.1 MMOL/L (ref 3.5–5.3)
PROT SERPL-MCNC: 7.8 G/DL (ref 6.4–8.2)
RBC # BLD AUTO: 5.19 MILLION/UL (ref 3.88–5.62)
SODIUM SERPL-SCNC: 139 MMOL/L (ref 136–145)
WBC # BLD AUTO: 12.3 THOUSAND/UL (ref 4.31–10.16)

## 2022-04-29 PROCEDURE — 83690 ASSAY OF LIPASE: CPT | Performed by: EMERGENCY MEDICINE

## 2022-04-29 PROCEDURE — 36415 COLL VENOUS BLD VENIPUNCTURE: CPT | Performed by: EMERGENCY MEDICINE

## 2022-04-29 PROCEDURE — 80053 COMPREHEN METABOLIC PANEL: CPT | Performed by: EMERGENCY MEDICINE

## 2022-04-29 PROCEDURE — 71046 X-RAY EXAM CHEST 2 VIEWS: CPT

## 2022-04-29 PROCEDURE — 99285 EMERGENCY DEPT VISIT HI MDM: CPT | Performed by: EMERGENCY MEDICINE

## 2022-04-29 PROCEDURE — 83880 ASSAY OF NATRIURETIC PEPTIDE: CPT | Performed by: EMERGENCY MEDICINE

## 2022-04-29 PROCEDURE — 93005 ELECTROCARDIOGRAM TRACING: CPT

## 2022-04-29 PROCEDURE — 85025 COMPLETE CBC W/AUTO DIFF WBC: CPT | Performed by: EMERGENCY MEDICINE

## 2022-04-29 PROCEDURE — 84484 ASSAY OF TROPONIN QUANT: CPT | Performed by: EMERGENCY MEDICINE

## 2022-04-29 PROCEDURE — 85379 FIBRIN DEGRADATION QUANT: CPT | Performed by: EMERGENCY MEDICINE

## 2022-04-29 PROCEDURE — 83735 ASSAY OF MAGNESIUM: CPT | Performed by: EMERGENCY MEDICINE

## 2022-04-29 PROCEDURE — 99285 EMERGENCY DEPT VISIT HI MDM: CPT

## 2022-04-29 RX ORDER — SODIUM CHLORIDE 9 MG/ML
3 INJECTION INTRAVENOUS
Status: DISCONTINUED | OUTPATIENT
Start: 2022-04-29 | End: 2022-04-29 | Stop reason: HOSPADM

## 2022-04-29 NOTE — ED PROVIDER NOTES
History  Chief Complaint   Patient presents with    Chest Pain     pt arrives reporting left sided chest pressure since awaking this morning  pt denies SOB  Patient is a 69-year-old male presenting to the emergency department today complaining of left-sided chest pressure that started upon awakening around 6:00 a m  This, the pain persisted throughout the day but patient reports it is gone at time of my initial evaluation, he denies any cough, cold or congestion, no injury or trauma, no recent illness, no nausea, vomiting or diarrhea, no fever or chills, no recent travel and no recent sick contacts, he did not take any medication at home for the pain as he did not feel it was bad enough to warrant medication          Prior to Admission Medications   Prescriptions Last Dose Informant Patient Reported? Taking?    amLODIPine (NORVASC) 5 mg tablet   No Yes   Sig: Take 1 tablet (5 mg total) by mouth daily   aspirin 81 mg chewable tablet   No Yes   Sig: Chew 1 tablet (81 mg total) daily   atorvastatin (LIPITOR) 40 mg tablet   No Yes   Sig: Take 1 tablet (40 mg total) by mouth every evening   clopidogrel (PLAVIX) 75 mg tablet   No Yes   Sig: Take 1 tablet (75 mg total) by mouth daily   finasteride (PROSCAR) 5 mg tablet   No Yes   Sig: Take 1 tablet (5 mg total) by mouth daily   hydrOXYzine HCL (ATARAX) 10 mg tablet   No Yes   Sig: Take 1 tablet (10 mg total) by mouth every 6 (six) hours as needed for itching   hydrocortisone 1 % cream   No Yes   Sig: Apply topically 2 (two) times a day   levothyroxine 100 mcg tablet   Yes Yes   Sig: Take by mouth daily Dose unknown   meclizine (ANTIVERT) 25 mg tablet   No Yes   Sig: Take 1 tablet (25 mg total) by mouth 3 (three) times a day as needed for dizziness   nicotine (NICODERM CQ) 14 mg/24hr TD 24 hr patch   No Yes   Sig: Place 1 patch on the skin daily   tamsulosin (FLOMAX) 0 4 mg   No Yes   Sig: Take 1 capsule (0 4 mg total) by mouth daily with dinner Facility-Administered Medications: None       Past Medical History:   Diagnosis Date    Disease of thyroid gland        Past Surgical History:   Procedure Laterality Date    APPENDECTOMY      APPENDECTOMY      HAND TENDON SURGERY  2013    ROTATOR CUFF REPAIR Right        Family History   Problem Relation Age of Onset    Arthritis Mother     Diabetes Father      I have reviewed and agree with the history as documented  E-Cigarette/Vaping    E-Cigarette Use Never User      E-Cigarette/Vaping Substances    Nicotine No     THC No     CBD No     Flavoring No     Other No     Unknown No      Social History     Tobacco Use    Smoking status: Current Every Day Smoker     Packs/day: 0 50     Years: 9 00     Pack years: 4 50     Types: Cigarettes     Start date:      Last attempt to quit:      Years since quittin 3    Smokeless tobacco: Never Used   Vaping Use    Vaping Use: Never used   Substance Use Topics    Alcohol use: Not Currently    Drug use: Yes     Types: Marijuana     Comment: 2-3 times a week       Review of Systems   Constitutional: Negative  HENT: Negative  Eyes: Negative  Respiratory: Negative  Cardiovascular: Positive for chest pain  Gastrointestinal: Negative  Endocrine: Negative  Genitourinary: Negative  Musculoskeletal: Negative  Skin: Negative  Allergic/Immunologic: Negative  Neurological: Negative  Hematological: Negative  Psychiatric/Behavioral: Negative  Physical Exam  Physical Exam  Constitutional:       Appearance: He is well-developed  HENT:      Head: Normocephalic and atraumatic  Eyes:      Conjunctiva/sclera: Conjunctivae normal       Pupils: Pupils are equal, round, and reactive to light  Cardiovascular:      Rate and Rhythm: Normal rate     Pulmonary:      Effort: Pulmonary effort is normal    Chest:          Comments: Tenderness to palpate over the midsternum, no crepitus, no bony deformity, no skin lesions, rashes or ecchymosis  Abdominal:      Palpations: Abdomen is soft  Musculoskeletal:         General: Normal range of motion  Cervical back: Normal range of motion and neck supple  Skin:     General: Skin is warm and dry  Neurological:      Mental Status: He is alert and oriented to person, place, and time           Vital Signs  ED Triage Vitals [04/29/22 1813]   Temperature Pulse Respirations Blood Pressure SpO2   (!) 97 1 °F (36 2 °C) 67 18 122/74 100 %      Temp src Heart Rate Source Patient Position - Orthostatic VS BP Location FiO2 (%)   -- -- -- -- --      Pain Score       3           Vitals:    04/29/22 1813 04/29/22 1845 04/29/22 1915 04/29/22 1945   BP: 122/74 111/69 101/68 112/76   Pulse: 67 64 62 61         Visual Acuity      ED Medications  Medications - No data to display    Diagnostic Studies  Results Reviewed     Procedure Component Value Units Date/Time    Comprehensive metabolic panel [[id number]] Collected: 04/29/22 1834    Lab Status: Final result Specimen: Blood from Arm, Right Updated: 04/29/22 1908     Sodium 139 mmol/L      Potassium 4 1 mmol/L      Chloride 104 mmol/L      CO2 29 mmol/L      ANION GAP 6 mmol/L      BUN 20 mg/dL      Creatinine 1 30 mg/dL      Glucose 83 mg/dL      Calcium 8 8 mg/dL      AST 19 U/L      ALT 34 U/L      Alkaline Phosphatase 97 U/L      Total Protein 7 8 g/dL      Albumin 3 9 g/dL      Total Bilirubin 0 68 mg/dL      eGFR 54 ml/min/1 73sq m     Narrative:      Piter guidelines for Chronic Kidney Disease (CKD):     Stage 1 with normal or high GFR (GFR > 90 mL/min/1 73 square meters)    Stage 2 Mild CKD (GFR = 60-89 mL/min/1 73 square meters)    Stage 3A Moderate CKD (GFR = 45-59 mL/min/1 73 square meters)    Stage 3B Moderate CKD (GFR = 30-44 mL/min/1 73 square meters)    Stage 4 Severe CKD (GFR = 15-29 mL/min/1 73 square meters)    Stage 5 End Stage CKD (GFR <15 mL/min/1 73 square meters)  Note: GFR calculation is accurate only with a steady state creatinine    Lipase [[id number]]  (Normal) Collected: 04/29/22 1834    Lab Status: Final result Specimen: Blood from Arm, Right Updated: 04/29/22 1908     Lipase 101 u/L     Magnesium [[id number]]  (Normal) Collected: 04/29/22 1834    Lab Status: Final result Specimen: Blood from Arm, Right Updated: 04/29/22 1908     Magnesium 1 9 mg/dL     NT-BNP PRO [[id number]]  (Abnormal) Collected: 04/29/22 1834    Lab Status: Final result Specimen: Blood from Arm, Right Updated: 04/29/22 1908     NT-proBNP 268 pg/mL     HS Troponin 0hr (reflex protocol) [[id number]]  (Normal) Collected: 04/29/22 1834    Lab Status: Final result Specimen: Blood from Arm, Right Updated: 04/29/22 1904     hs TnI 0hr 6 ng/L     D-dimer, quantitative [[id number]]  (Normal) Collected: 04/29/22 1834    Lab Status: Final result Specimen: Blood from Arm, Right Updated: 04/29/22 1852     D-Dimer, Quant 0 39 ug/ml FEU     Narrative: In the evaluation for possible pulmonary embolism, in the appropriate (Well's Score of 4 or less) patient, the age adjusted d-dimer cutoff for this patient can be calculated as:    Age x 0 01 (in ug/mL) for Age-adjusted D-dimer exclusion threshold for a patient over 50 years      CBC and differential [[id number]]  (Abnormal) Collected: 04/29/22 1834    Lab Status: Final result Specimen: Blood from Arm, Right Updated: 04/29/22 1839     WBC 12 30 Thousand/uL      RBC 5 19 Million/uL      Hemoglobin 15 6 g/dL      Hematocrit 46 7 %      MCV 90 fL      MCH 30 1 pg      MCHC 33 4 g/dL      RDW 13 3 %      MPV 9 6 fL      Platelets 518 Thousands/uL      nRBC 0 /100 WBCs      Neutrophils Relative 63 %      Immat GRANS % 1 %      Lymphocytes Relative 20 %      Monocytes Relative 13 %      Eosinophils Relative 2 %      Basophils Relative 1 %      Neutrophils Absolute 7 85 Thousands/µL      Immature Grans Absolute 0 06 Thousand/uL      Lymphocytes Absolute 2 47 Thousands/µL      Monocytes Absolute 1 56 Thousand/µL      Eosinophils Absolute 0 27 Thousand/µL      Basophils Absolute 0 09 Thousands/µL                  X-ray chest 2 views   ED Interpretation by Herber Andrea DO (04/29 1903)   No acute findings      Final Result by Kanchan Brady MD (04/29 2144)      No acute cardiopulmonary disease                    Workstation performed: [id number]                    Procedures  ECG 12 Lead Documentation Only    Date/Time: 4/29/2022 6:16 PM  Performed by: Herber Andrea DO  Authorized by: Herber Andrea DO     Indications / Diagnosis:  Chest pain  ECG reviewed by me, the ED Provider: yes    Patient location:  ED  Previous ECG:     Previous ECG:  Compared to current    Comparison ECG info:  03/12/2021    Similarity:  No change    Comparison to cardiac monitor: Yes    Interpretation:     Interpretation: normal    Rate:     ECG rate:  67    ECG rate assessment: normal    Rhythm:     Rhythm: sinus rhythm    Ectopy:     Ectopy: none    QRS:     QRS intervals:  Normal  Conduction:     Conduction: normal    ST segments:     ST segments:  Normal  T waves:     T waves: flattening      Flattening:  AVL             ED Course  ED Course as of 04/30/22 1944   Sat Apr 30, 2022 1943 Patient resting comfortably with stable vital signs, lab and imaging findings discussed at bedside which are relatively unremarkable with troponin negative, patient has been experiencing the symptoms since early this morning and he is convinced it is related to smoking cigarettes, I advised that it could quite possibly be related to smoking and he should discontinue smoking, otherwise follow up with his PCP or return if any additional concerns, patient acknowledges understanding and agreement with this             HEART Risk Score      Most Recent Value   Heart Score Risk Calculator    History 0 Filed at: 04/29/2022 1949   ECG 0 Filed at: 04/29/2022 1949   Age 2 Filed at: 04/29/2022 1949   Risk Factors 1 Filed at: 04/29/2022 1949   Troponin 0 Filed at: 04/29/2022 1949   HEART Score 3 Filed at: 04/29/2022 1949                                        Disposition  Final diagnoses:   Atypical chest pain   Smoking     Time reflects when diagnosis was documented in both MDM as applicable and the Disposition within this note     Time User Action Codes Description Comment    4/29/2022  7:42 PM Eliza Irma Add [R07 89] Atypical chest pain     4/29/2022  7:43 PM Eliza Irma Add [F17 200] Smoking       ED Disposition     ED Disposition Condition Date/Time Comment    Discharge Stable Fri Apr 29, 2022  7:42 PM Shonna Pisano discharge to home/self care              Follow-up Information     Follow up With Specialties Details Why Contact Mary Tinoco DO Family Medicine In 2 days  [address] [place] [address]  [phone]            Discharge Medication List as of 4/29/2022  7:44 PM      CONTINUE these medications which have NOT CHANGED    Details   amLODIPine (NORVASC) 5 mg tablet Take 1 tablet (5 mg total) by mouth daily, Starting Tue 3/2/2021, Normal      aspirin 81 mg chewable tablet Chew 1 tablet (81 mg total) daily, Starting Tue 3/2/2021, Normal      atorvastatin (LIPITOR) 40 mg tablet Take 1 tablet (40 mg total) by mouth every evening, Starting Mon 3/1/2021, Normal      clopidogrel (PLAVIX) 75 mg tablet Take 1 tablet (75 mg total) by mouth daily, Starting Tue 3/2/2021, Normal      finasteride (PROSCAR) 5 mg tablet Take 1 tablet (5 mg total) by mouth daily, Starting Mon 4/11/2022, Normal      hydrocortisone 1 % cream Apply topically 2 (two) times a day, Starting Mon 3/1/2021, Normal      hydrOXYzine HCL (ATARAX) 10 mg tablet Take 1 tablet (10 mg total) by mouth every 6 (six) hours as needed for itching, Starting Mon 3/1/2021, Normal      levothyroxine 100 mcg tablet Take by mouth daily Dose unknown, Historical Med      meclizine (ANTIVERT) 25 mg tablet Take 1 tablet (25 mg total) by mouth 3 (three) times a day as needed for dizziness, Starting Fri 3/12/2021, Normal      nicotine (NICODERM CQ) 14 mg/24hr TD 24 hr patch Place 1 patch on the skin daily, Starting Tue 3/2/2021, Normal      tamsulosin (FLOMAX) 0 4 mg Take 1 capsule (0 4 mg total) by mouth daily with dinner, Starting Mon 4/11/2022, Normal             No discharge procedures on file      PDMP Review     None          ED Provider  Electronically Signed by           Tye Meza DO  04/30/22 1941

## 2022-05-02 LAB
ATRIAL RATE: 67 BPM
P AXIS: 42 DEGREES
PR INTERVAL: 206 MS
QRS AXIS: -47 DEGREES
QRSD INTERVAL: 98 MS
QT INTERVAL: 402 MS
QTC INTERVAL: 424 MS
T WAVE AXIS: 50 DEGREES
VENTRICULAR RATE: 67 BPM

## 2022-05-09 DIAGNOSIS — N40.0 BENIGN PROSTATIC HYPERPLASIA, UNSPECIFIED WHETHER LOWER URINARY TRACT SYMPTOMS PRESENT: ICD-10-CM

## 2022-05-09 RX ORDER — TAMSULOSIN HYDROCHLORIDE 0.4 MG/1
0.4 CAPSULE ORAL
Qty: 90 CAPSULE | Refills: 3 | Status: SHIPPED | OUTPATIENT
Start: 2022-05-09

## 2022-10-12 PROBLEM — Z12.5 PROSTATE CANCER SCREENING: Status: RESOLVED | Noted: 2021-08-09 | Resolved: 2022-10-12

## 2022-10-14 ENCOUNTER — HOSPITAL ENCOUNTER (EMERGENCY)
Facility: HOSPITAL | Age: 72
Discharge: HOME | End: 2022-10-14
Attending: EMERGENCY MEDICINE
Payer: COMMERCIAL

## 2022-10-14 ENCOUNTER — APPOINTMENT (EMERGENCY)
Dept: CT IMAGING | Facility: HOSPITAL | Age: 72
End: 2022-10-14
Payer: COMMERCIAL

## 2022-10-14 ENCOUNTER — APPOINTMENT (OUTPATIENT)
Dept: RADIOLOGY | Facility: HOSPITAL | Age: 72
End: 2022-10-14
Payer: COMMERCIAL

## 2022-10-14 VITALS
TEMPERATURE: 97.6 F | DIASTOLIC BLOOD PRESSURE: 58 MMHG | OXYGEN SATURATION: 97 % | HEART RATE: 60 BPM | RESPIRATION RATE: 16 BRPM | WEIGHT: 181.88 LBS | HEIGHT: 75 IN | BODY MASS INDEX: 22.61 KG/M2 | SYSTOLIC BLOOD PRESSURE: 107 MMHG

## 2022-10-14 DIAGNOSIS — I95.1 ORTHOSTATIC HYPOTENSION: Primary | ICD-10-CM

## 2022-10-14 DIAGNOSIS — R55 NEAR SYNCOPE: ICD-10-CM

## 2022-10-14 DIAGNOSIS — N28.9 RENAL INSUFFICIENCY: ICD-10-CM

## 2022-10-14 LAB
ALBUMIN SERPL BCP-MCNC: 3.7 G/DL (ref 3.5–5)
ALP SERPL-CCNC: 83 U/L (ref 46–116)
ALT SERPL W P-5'-P-CCNC: 18 U/L (ref 12–78)
ANION GAP SERPL CALCULATED.3IONS-SCNC: 5 MMOL/L (ref 4–13)
APTT PPP: 32 SECONDS (ref 23–37)
AST SERPL W P-5'-P-CCNC: 13 U/L (ref 5–45)
BASOPHILS # BLD AUTO: 0.06 THOUSANDS/ΜL (ref 0–0.1)
BASOPHILS NFR BLD AUTO: 1 % (ref 0–1)
BILIRUB SERPL-MCNC: 1.04 MG/DL (ref 0.2–1)
BUN SERPL-MCNC: 26 MG/DL (ref 5–25)
CALCIUM SERPL-MCNC: 8.9 MG/DL (ref 8.3–10.1)
CARDIAC TROPONIN I PNL SERPL HS: 7 NG/L
CHLORIDE SERPL-SCNC: 103 MMOL/L (ref 96–108)
CO2 SERPL-SCNC: 30 MMOL/L (ref 21–32)
CREAT SERPL-MCNC: 1.4 MG/DL (ref 0.6–1.3)
EOSINOPHIL # BLD AUTO: 0.24 THOUSAND/ΜL (ref 0–0.61)
EOSINOPHIL NFR BLD AUTO: 3 % (ref 0–6)
ERYTHROCYTE [DISTWIDTH] IN BLOOD BY AUTOMATED COUNT: 12.7 % (ref 11.6–15.1)
GFR SERPL CREATININE-BSD FRML MDRD: 49 ML/MIN/1.73SQ M
GLUCOSE SERPL-MCNC: 94 MG/DL (ref 65–140)
HCT VFR BLD AUTO: 45.3 % (ref 36.5–49.3)
HGB BLD-MCNC: 15.8 G/DL (ref 12–17)
IMM GRANULOCYTES # BLD AUTO: 0.03 THOUSAND/UL (ref 0–0.2)
IMM GRANULOCYTES NFR BLD AUTO: 0 % (ref 0–2)
INR PPP: 1.03 (ref 0.84–1.19)
LYMPHOCYTES # BLD AUTO: 1.95 THOUSANDS/ΜL (ref 0.6–4.47)
LYMPHOCYTES NFR BLD AUTO: 25 % (ref 14–44)
MAGNESIUM SERPL-MCNC: 2 MG/DL (ref 1.6–2.6)
MCH RBC QN AUTO: 31.4 PG (ref 26.8–34.3)
MCHC RBC AUTO-ENTMCNC: 34.9 G/DL (ref 31.4–37.4)
MCV RBC AUTO: 90 FL (ref 82–98)
MONOCYTES # BLD AUTO: 1.12 THOUSAND/ΜL (ref 0.17–1.22)
MONOCYTES NFR BLD AUTO: 14 % (ref 4–12)
NEUTROPHILS # BLD AUTO: 4.43 THOUSANDS/ΜL (ref 1.85–7.62)
NEUTS SEG NFR BLD AUTO: 57 % (ref 43–75)
NRBC BLD AUTO-RTO: 0 /100 WBCS
NT-PROBNP SERPL-MCNC: 282 PG/ML
PLATELET # BLD AUTO: 263 THOUSANDS/UL (ref 149–390)
PMV BLD AUTO: 9.2 FL (ref 8.9–12.7)
POTASSIUM SERPL-SCNC: 4 MMOL/L (ref 3.5–5.3)
PROT SERPL-MCNC: 7 G/DL (ref 6.4–8.4)
PROTHROMBIN TIME: 13.6 SECONDS (ref 11.6–14.5)
RBC # BLD AUTO: 5.03 MILLION/UL (ref 3.88–5.62)
SODIUM SERPL-SCNC: 138 MMOL/L (ref 135–147)
WBC # BLD AUTO: 7.83 THOUSAND/UL (ref 4.31–10.16)

## 2022-10-14 PROCEDURE — 36415 COLL VENOUS BLD VENIPUNCTURE: CPT | Performed by: EMERGENCY MEDICINE

## 2022-10-14 PROCEDURE — 83735 ASSAY OF MAGNESIUM: CPT | Performed by: EMERGENCY MEDICINE

## 2022-10-14 PROCEDURE — 85025 COMPLETE CBC W/AUTO DIFF WBC: CPT | Performed by: EMERGENCY MEDICINE

## 2022-10-14 PROCEDURE — 71045 X-RAY EXAM CHEST 1 VIEW: CPT

## 2022-10-14 PROCEDURE — 85610 PROTHROMBIN TIME: CPT | Performed by: EMERGENCY MEDICINE

## 2022-10-14 PROCEDURE — 85730 THROMBOPLASTIN TIME PARTIAL: CPT | Performed by: EMERGENCY MEDICINE

## 2022-10-14 PROCEDURE — 99285 EMERGENCY DEPT VISIT HI MDM: CPT

## 2022-10-14 PROCEDURE — G1004 CDSM NDSC: HCPCS

## 2022-10-14 PROCEDURE — 70450 CT HEAD/BRAIN W/O DYE: CPT

## 2022-10-14 PROCEDURE — 83880 ASSAY OF NATRIURETIC PEPTIDE: CPT | Performed by: EMERGENCY MEDICINE

## 2022-10-14 PROCEDURE — 84484 ASSAY OF TROPONIN QUANT: CPT | Performed by: EMERGENCY MEDICINE

## 2022-10-14 PROCEDURE — 96361 HYDRATE IV INFUSION ADD-ON: CPT

## 2022-10-14 PROCEDURE — 93005 ELECTROCARDIOGRAM TRACING: CPT

## 2022-10-14 PROCEDURE — 99285 EMERGENCY DEPT VISIT HI MDM: CPT | Performed by: EMERGENCY MEDICINE

## 2022-10-14 PROCEDURE — 96360 HYDRATION IV INFUSION INIT: CPT

## 2022-10-14 PROCEDURE — 80053 COMPREHEN METABOLIC PANEL: CPT | Performed by: EMERGENCY MEDICINE

## 2022-10-14 RX ADMIN — SODIUM CHLORIDE 1000 ML: 0.9 INJECTION, SOLUTION INTRAVENOUS at 11:55

## 2022-10-14 NOTE — ED PROVIDER NOTES
History  Chief Complaint   Patient presents with   • Dizziness     Pt reports dizziness when changing positions for past week and feeling like he is going to pass out  Patient is a 67-year-old male presents emergency department complaining of dizziness described as feeling as though he might pass out and lightheadedness when he stands up and moves around  Symptoms are not present when he is laying down at all he denies any associated chest pain shortness breast or palpitations no headache no numbness or weakness  History provided by:  Patient  Dizziness  Quality:  Lightheadedness  Severity:  Moderate  Onset quality:  Gradual  Duration:  1 week  Timing:  Constant  Progression:  Worsening  Chronicity:  New  Associated symptoms: no chest pain, no diarrhea, no headaches, no nausea, no palpitations, no shortness of breath, no vomiting and no weakness        Prior to Admission Medications   Prescriptions Last Dose Informant Patient Reported? Taking?    amLODIPine (NORVASC) 5 mg tablet   No No   Sig: Take 1 tablet (5 mg total) by mouth daily   aspirin 81 mg chewable tablet   No No   Sig: Chew 1 tablet (81 mg total) daily   atorvastatin (LIPITOR) 40 mg tablet   No No   Sig: Take 1 tablet (40 mg total) by mouth every evening   clopidogrel (PLAVIX) 75 mg tablet   No No   Sig: Take 1 tablet (75 mg total) by mouth daily   finasteride (PROSCAR) 5 mg tablet   No No   Sig: Take 1 tablet (5 mg total) by mouth daily   hydrOXYzine HCL (ATARAX) 10 mg tablet   No No   Sig: Take 1 tablet (10 mg total) by mouth every 6 (six) hours as needed for itching   hydrocortisone 1 % cream   No No   Sig: Apply topically 2 (two) times a day   levothyroxine 100 mcg tablet   Yes No   Sig: Take by mouth daily Dose unknown   meclizine (ANTIVERT) 25 mg tablet   No No   Sig: Take 1 tablet (25 mg total) by mouth 3 (three) times a day as needed for dizziness   nicotine (NICODERM CQ) 14 mg/24hr TD 24 hr patch   No No   Sig: Place 1 patch on the skin daily   tamsulosin (FLOMAX) 0 4 mg   No No   Sig: Take 1 capsule (0 4 mg total) by mouth daily with dinner      Facility-Administered Medications: None       Past Medical History:   Diagnosis Date   • BPH (benign prostatic hyperplasia)    • Disease of thyroid gland    • High cholesterol    • Hypertension        Past Surgical History:   Procedure Laterality Date   • APPENDECTOMY     • APPENDECTOMY     • HAND TENDON SURGERY     • ROTATOR CUFF REPAIR Right        Family History   Problem Relation Age of Onset   • Arthritis Mother    • Diabetes Father      I have reviewed and agree with the history as documented  E-Cigarette/Vaping   • E-Cigarette Use Never User      E-Cigarette/Vaping Substances   • Nicotine No    • THC No    • CBD No    • Flavoring No    • Other No    • Unknown No      Social History     Tobacco Use   • Smoking status: Current Every Day Smoker     Packs/day: 0 50     Years: 9 00     Pack years: 4 50     Types: Cigarettes     Start date:      Last attempt to quit:      Years since quittin 7   • Smokeless tobacco: Never Used   Vaping Use   • Vaping Use: Never used   Substance Use Topics   • Alcohol use: Not Currently   • Drug use: Yes     Types: Marijuana     Comment: 2-3 times a week       Review of Systems   Constitutional: Negative for activity change, appetite change, chills, fatigue and fever  HENT: Negative for congestion, ear pain, rhinorrhea and sore throat  Eyes: Negative for discharge, redness and visual disturbance  Respiratory: Negative for cough, chest tightness, shortness of breath and wheezing  Cardiovascular: Negative for chest pain and palpitations  Gastrointestinal: Negative for abdominal pain, constipation, diarrhea, nausea and vomiting  Endocrine: Negative for polydipsia and polyuria  Genitourinary: Negative for difficulty urinating, dysuria, frequency, hematuria and urgency  Musculoskeletal: Negative for arthralgias and myalgias     Skin: Negative for color change, pallor and rash  Neurological: Positive for dizziness and light-headedness  Negative for weakness, numbness and headaches  Hematological: Negative for adenopathy  Does not bruise/bleed easily  All other systems reviewed and are negative  Physical Exam  Physical Exam  Vitals and nursing note reviewed  Constitutional:       Appearance: He is well-developed  HENT:      Head: Normocephalic and atraumatic  Right Ear: External ear normal       Left Ear: External ear normal       Nose: Nose normal    Eyes:      Conjunctiva/sclera: Conjunctivae normal       Pupils: Pupils are equal, round, and reactive to light  Cardiovascular:      Rate and Rhythm: Normal rate and regular rhythm  Heart sounds: Normal heart sounds  Pulmonary:      Effort: Pulmonary effort is normal  No respiratory distress  Breath sounds: Normal breath sounds  No wheezing or rales  Chest:      Chest wall: No tenderness  Abdominal:      General: Bowel sounds are normal  There is no distension  Palpations: Abdomen is soft  Tenderness: There is no abdominal tenderness  There is no guarding  Musculoskeletal:         General: Normal range of motion  Cervical back: Normal range of motion and neck supple  Skin:     General: Skin is warm and dry  Neurological:      Mental Status: He is alert and oriented to person, place, and time  Cranial Nerves: No cranial nerve deficit  Sensory: No sensory deficit           Vital Signs  ED Triage Vitals   Temperature Pulse Respirations Blood Pressure SpO2   10/14/22 1144 10/14/22 1144 10/14/22 1144 10/14/22 1145 10/14/22 1144   97 6 °F (36 4 °C) 68 18 120/73 98 %      Temp src Heart Rate Source Patient Position - Orthostatic VS BP Location FiO2 (%)   -- 10/14/22 1208 10/14/22 1208 10/14/22 1208 --    Monitor Standing - Orthostatic VS Right arm       Pain Score       10/14/22 1144       No Pain           Vitals:    10/14/22 1209 10/14/22 1210 10/14/22 1215 10/14/22 1230   BP: 101/57 (!) 83/50 106/73 107/65   Pulse: 62 67 56 59   Patient Position - Orthostatic VS: Sitting - Orthostatic VS Standing - Orthostatic VS Lying Lying         Visual Acuity  Visual Acuity    Flowsheet Row Most Recent Value   L Pupil Size (mm) 3   R Pupil Size (mm) 3          ED Medications  Medications   sodium chloride 0 9 % bolus 1,000 mL (1,000 mL Intravenous New Bag 10/14/22 1155)       Diagnostic Studies  Results Reviewed     Procedure Component Value Units Date/Time    Comprehensive metabolic panel [[id number]]  (Abnormal) Collected: 10/14/22 1153    Lab Status: Final result Specimen: Blood from Arm, Right Updated: 10/14/22 1228     Sodium 138 mmol/L      Potassium 4 0 mmol/L      Chloride 103 mmol/L      CO2 30 mmol/L      ANION GAP 5 mmol/L      BUN 26 mg/dL      Creatinine 1 40 mg/dL      Glucose 94 mg/dL      Calcium 8 9 mg/dL      AST 13 U/L      ALT 18 U/L      Alkaline Phosphatase 83 U/L      Total Protein 7 0 g/dL      Albumin 3 7 g/dL      Total Bilirubin 1 04 mg/dL      eGFR 49 ml/min/1 73sq m     Narrative:      [place] guidelines for Chronic Kidney Disease (CKD):   •  Stage 1 with normal or high GFR (GFR > 90 mL/min/1 73 square meters)  •  Stage 2 Mild CKD (GFR = 60-89 mL/min/1 73 square meters)  •  Stage 3A Moderate CKD (GFR = 45-59 mL/min/1 73 square meters)  •  Stage 3B Moderate CKD (GFR = 30-44 mL/min/1 73 square meters)  •  Stage 4 Severe CKD (GFR = 15-29 mL/min/1 73 square meters)  •  Stage 5 End Stage CKD (GFR <15 mL/min/1 73 square meters)  Note: GFR calculation is accurate only with a steady state creatinine    HS Troponin 0hr (reflex protocol) [[id number]]  (Normal) Collected: 10/14/22 1153    Lab Status: Final result Specimen: Blood from Arm, Right Updated: 10/14/22 1228     hs TnI 0hr 7 ng/L     NT-BNP PRO [[id number]]  (Abnormal) Collected: 10/14/22 1153    Lab Status: Final result Specimen: Blood from Arm, Right Updated: 10/14/22 1228     NT-proBNP 282 pg/mL     Magnesium [[id number]]  (Normal) Collected: 10/14/22 1153    Lab Status: Final result Specimen: Blood from Arm, Right Updated: 10/14/22 1228     Magnesium 2 0 mg/dL     HS Troponin I 2hr [[id number]]     Lab Status: No result Specimen: Blood     HS Troponin I 4hr [[id number]]     Lab Status: No result Specimen: Blood     Protime-INR [[id number]]  (Normal) Collected: 10/14/22 1153    Lab Status: Final result Specimen: Blood from Arm, Right Updated: 10/14/22 1215     Protime 13 6 seconds      INR 1 03    APTT [[id number]]  (Normal) Collected: 10/14/22 1153    Lab Status: Final result Specimen: Blood from Arm, Right Updated: 10/14/22 1215     PTT 32 seconds     CBC and differential [[id number]]  (Abnormal) Collected: 10/14/22 1153    Lab Status: Final result Specimen: Blood from Arm, Right Updated: 10/14/22 1200     WBC 7 83 Thousand/uL      RBC 5 03 Million/uL      Hemoglobin 15 8 g/dL      Hematocrit 45 3 %      MCV 90 fL      MCH 31 4 pg      MCHC 34 9 g/dL      RDW 12 7 %      MPV 9 2 fL      Platelets 007 Thousands/uL      nRBC 0 /100 WBCs      Neutrophils Relative 57 %      Immat GRANS % 0 %      Lymphocytes Relative 25 %      Monocytes Relative 14 %      Eosinophils Relative 3 %      Basophils Relative 1 %      Neutrophils Absolute 4 43 Thousands/µL      Immature Grans Absolute 0 03 Thousand/uL      Lymphocytes Absolute 1 95 Thousands/µL      Monocytes Absolute 1 12 Thousand/µL      Eosinophils Absolute 0 24 Thousand/µL      Basophils Absolute 0 06 Thousands/µL     UA w Reflex to Microscopic w Reflex to Culture [[id number]]     Lab Status: No result Specimen: Urine                  CT head without contrast   Final Result by Lamont Rankin MD (10/14 1302)      No evidence of acute intracranial process  Chronic microangiopathy                    Workstation performed: [id number]         XR chest 1 view portable   Final Result by Han Valdez MD (10/14 1226)      No acute cardiopulmonary disease  Findings are stable            Workstation performed: [id number]                    Procedures  ECG 12 Lead Documentation Only    Date/Time: 10/14/2022 12:17 PM  Performed by: Cynthia Duque DO  Authorized by: Cynthia Duque DO     ECG reviewed by me, the ED Provider: yes    Patient location:  ED  Previous ECG:     Comparison to cardiac monitor: Yes    Quality:     Tracing quality:  Limited by artifact  Rate:     ECG rate:  59    ECG rate assessment: bradycardic    Rhythm:     Rhythm: sinus bradycardia    QRS:     QRS axis:  Left    QRS intervals:  Normal  Conduction:     Conduction: normal    ST segments:     ST segments:  Normal  T waves:     T waves: normal               ED Course  ED Course as of 10/14/22 1313   Fri Oct 14, 2022   1214 Patient does have high positive orthostatic hypotension with drop of blood pressure into the 80s on standing and symptomatic resolves with lying back down  Did not have tachycardia  64 Daughter reports that patient has been having similar orthostatic dizziness/lightheadedness symptoms for about 1 year now since he was started on several medications after having a stroke  MDM  Number of Diagnoses or Management Options  Near syncope: new and requires workup  Orthostatic hypotension: new and requires workup  Renal insufficiency: new and requires workup  Diagnosis management comments: Patient remained clinically and hemodynamically stable in the emergency department with normal nonfocal neurologic examination symptoms consistent with orthostatic hypotension and near-syncope when standing patient is positive for orthostatic hypotension in the ED advised standing slowly and avoid strenuous activity for now and hold his antihypertensive medications and follow-up promptly with PCP for further evaluation and treatment  return precautions and anticipatory guidance discussed  Amount and/or Complexity of Data Reviewed  Clinical lab tests: reviewed and ordered  Tests in the radiology section of CPT®: ordered and reviewed  Tests in the medicine section of CPT®: ordered and reviewed  Decide to obtain previous medical records or to obtain history from someone other than the patient: yes  Review and summarize past medical records: yes  Independent visualization of images, tracings, or specimens: yes    Risk of Complications, Morbidity, and/or Mortality  Presenting problems: moderate  Diagnostic procedures: moderate  Management options: moderate    Patient Progress  Patient progress: stable      Disposition  Final diagnoses:   Orthostatic hypotension   Near syncope   Renal insufficiency - Mild     Time reflects when diagnosis was documented in both MDM as applicable and the Disposition within this note     Time User Action Codes Description Comment    10/14/2022  1:11 PM Zeus Perone Add [I95 1] Orthostatic hypotension     10/14/2022  1:12 PM John Flores Add [R55] Near syncope     10/14/2022  1:12 PM [place] Perone Add [N28 9] Renal insufficiency     10/14/2022  1:12 PM [place] Perone Modify [N28 9] Renal insufficiency Mild      ED Disposition     ED Disposition   Discharge    Condition   Stable    Date/Time   Fri Oct 14, 2022  1:12 PM    Comment   Agatha Degroot discharge to home/self care  Follow-up Information     Follow up With Specialties Details Why Contact Info    Anny Berman DO Family Medicine Schedule an appointment as soon as possible for a visit in 2 days  [address]  [phone]            Patient's Medications   Discharge Prescriptions    No medications on file       No discharge procedures on file      PDMP Review     None          ED Provider  Electronically Signed by           Kaya Camilo DO  10/14/22 0860

## 2022-10-21 LAB
ATRIAL RATE: 59 BPM
P AXIS: 5 DEGREES
PR INTERVAL: 182 MS
QRS AXIS: -35 DEGREES
QRSD INTERVAL: 90 MS
QT INTERVAL: 418 MS
QTC INTERVAL: 413 MS
T WAVE AXIS: 13 DEGREES
VENTRICULAR RATE: 59 BPM

## 2022-10-21 PROCEDURE — 93010 ELECTROCARDIOGRAM REPORT: CPT | Performed by: INTERNAL MEDICINE

## 2024-03-13 ENCOUNTER — APPOINTMENT (EMERGENCY)
Dept: RADIOLOGY | Facility: HOSPITAL | Age: 74
End: 2024-03-13
Payer: COMMERCIAL

## 2024-03-13 ENCOUNTER — HOSPITAL ENCOUNTER (EMERGENCY)
Facility: HOSPITAL | Age: 74
Discharge: HOME | End: 2024-03-13
Attending: EMERGENCY MEDICINE
Payer: COMMERCIAL

## 2024-03-13 VITALS
SYSTOLIC BLOOD PRESSURE: 123 MMHG | BODY MASS INDEX: 22.73 KG/M2 | DIASTOLIC BLOOD PRESSURE: 82 MMHG | HEIGHT: 75 IN | HEART RATE: 54 BPM | TEMPERATURE: 97.7 F | OXYGEN SATURATION: 94 % | RESPIRATION RATE: 15 BRPM

## 2024-03-13 DIAGNOSIS — R53.1 WEAKNESS: Primary | ICD-10-CM

## 2024-03-13 LAB
ALBUMIN SERPL BCP-MCNC: 4.2 G/DL (ref 3.5–5)
ALP SERPL-CCNC: 46 U/L (ref 34–104)
ALT SERPL W P-5'-P-CCNC: 66 U/L (ref 7–52)
ANION GAP SERPL CALCULATED.3IONS-SCNC: 6 MMOL/L (ref 4–13)
AST SERPL W P-5'-P-CCNC: 88 U/L (ref 13–39)
BACTERIA UR QL AUTO: ABNORMAL /HPF
BASOPHILS # BLD AUTO: 0.1 THOUSANDS/ÂΜL (ref 0–0.1)
BASOPHILS NFR BLD AUTO: 1 % (ref 0–1)
BILIRUB SERPL-MCNC: 1.02 MG/DL (ref 0.2–1)
BILIRUB UR QL STRIP: NEGATIVE
BNP SERPL-MCNC: 55 PG/ML (ref 0–100)
BUN SERPL-MCNC: 21 MG/DL (ref 5–25)
CALCIUM SERPL-MCNC: 9.4 MG/DL (ref 8.4–10.2)
CARDIAC TROPONIN I PNL SERPL HS: 13 NG/L
CHLORIDE SERPL-SCNC: 105 MMOL/L (ref 96–108)
CLARITY UR: CLEAR
CO2 SERPL-SCNC: 28 MMOL/L (ref 21–32)
COLOR UR: YELLOW
CREAT SERPL-MCNC: 1.69 MG/DL (ref 0.6–1.3)
EOSINOPHIL # BLD AUTO: 0.19 THOUSAND/ÂΜL (ref 0–0.61)
EOSINOPHIL NFR BLD AUTO: 2 % (ref 0–6)
ERYTHROCYTE [DISTWIDTH] IN BLOOD BY AUTOMATED COUNT: 13.6 % (ref 11.6–15.1)
FLUAV RNA RESP QL NAA+PROBE: NEGATIVE
FLUBV RNA RESP QL NAA+PROBE: NEGATIVE
GFR SERPL CREATININE-BSD FRML MDRD: 39 ML/MIN/1.73SQ M
GLUCOSE SERPL-MCNC: 78 MG/DL (ref 65–140)
GLUCOSE UR STRIP-MCNC: NEGATIVE MG/DL
HCT VFR BLD AUTO: 40.1 % (ref 36.5–49.3)
HGB BLD-MCNC: 13.9 G/DL (ref 12–17)
HGB UR QL STRIP.AUTO: ABNORMAL
HYALINE CASTS #/AREA URNS LPF: ABNORMAL /LPF
IMM GRANULOCYTES # BLD AUTO: 0.09 THOUSAND/UL (ref 0–0.2)
IMM GRANULOCYTES NFR BLD AUTO: 1 % (ref 0–2)
KETONES UR STRIP-MCNC: NEGATIVE MG/DL
LACTATE SERPL-SCNC: 0.8 MMOL/L (ref 0.5–2)
LEUKOCYTE ESTERASE UR QL STRIP: NEGATIVE
LIPASE SERPL-CCNC: 35 U/L (ref 11–82)
LYMPHOCYTES # BLD AUTO: 2.55 THOUSANDS/ÂΜL (ref 0.6–4.47)
LYMPHOCYTES NFR BLD AUTO: 29 % (ref 14–44)
MAGNESIUM SERPL-MCNC: 2.1 MG/DL (ref 1.9–2.7)
MCH RBC QN AUTO: 33.1 PG (ref 26.8–34.3)
MCHC RBC AUTO-ENTMCNC: 34.7 G/DL (ref 31.4–37.4)
MCV RBC AUTO: 96 FL (ref 82–98)
MONOCYTES # BLD AUTO: 0.91 THOUSAND/ÂΜL (ref 0.17–1.22)
MONOCYTES NFR BLD AUTO: 10 % (ref 4–12)
MUCOUS THREADS UR QL AUTO: ABNORMAL
NEUTROPHILS # BLD AUTO: 4.92 THOUSANDS/ÂΜL (ref 1.85–7.62)
NEUTS SEG NFR BLD AUTO: 57 % (ref 43–75)
NITRITE UR QL STRIP: NEGATIVE
NON-SQ EPI CELLS URNS QL MICRO: ABNORMAL /HPF
NRBC BLD AUTO-RTO: 0 /100 WBCS
PH UR STRIP.AUTO: 6.5 [PH]
PLATELET # BLD AUTO: 221 THOUSANDS/UL (ref 149–390)
PMV BLD AUTO: 9.3 FL (ref 8.9–12.7)
POTASSIUM SERPL-SCNC: 4.2 MMOL/L (ref 3.5–5.3)
PROT SERPL-MCNC: 7.2 G/DL (ref 6.4–8.4)
PROT UR STRIP-MCNC: NEGATIVE MG/DL
RBC # BLD AUTO: 4.2 MILLION/UL (ref 3.88–5.62)
RBC #/AREA URNS AUTO: ABNORMAL /HPF
RSV RNA RESP QL NAA+PROBE: NEGATIVE
SARS-COV-2 RNA RESP QL NAA+PROBE: NEGATIVE
SODIUM SERPL-SCNC: 139 MMOL/L (ref 135–147)
SP GR UR STRIP.AUTO: 1.02 (ref 1–1.03)
UROBILINOGEN UR QL STRIP.AUTO: 2 E.U./DL
WBC # BLD AUTO: 8.76 THOUSAND/UL (ref 4.31–10.16)
WBC #/AREA URNS AUTO: ABNORMAL /HPF

## 2024-03-13 PROCEDURE — 83735 ASSAY OF MAGNESIUM: CPT | Performed by: EMERGENCY MEDICINE

## 2024-03-13 PROCEDURE — 99284 EMERGENCY DEPT VISIT MOD MDM: CPT

## 2024-03-13 PROCEDURE — 85025 COMPLETE CBC W/AUTO DIFF WBC: CPT | Performed by: EMERGENCY MEDICINE

## 2024-03-13 PROCEDURE — 99284 EMERGENCY DEPT VISIT MOD MDM: CPT | Performed by: EMERGENCY MEDICINE

## 2024-03-13 PROCEDURE — 96360 HYDRATION IV INFUSION INIT: CPT

## 2024-03-13 PROCEDURE — 83880 ASSAY OF NATRIURETIC PEPTIDE: CPT | Performed by: EMERGENCY MEDICINE

## 2024-03-13 PROCEDURE — 81001 URINALYSIS AUTO W/SCOPE: CPT | Performed by: EMERGENCY MEDICINE

## 2024-03-13 PROCEDURE — 83690 ASSAY OF LIPASE: CPT | Performed by: EMERGENCY MEDICINE

## 2024-03-13 PROCEDURE — 83605 ASSAY OF LACTIC ACID: CPT | Performed by: EMERGENCY MEDICINE

## 2024-03-13 PROCEDURE — 87040 BLOOD CULTURE FOR BACTERIA: CPT | Performed by: EMERGENCY MEDICINE

## 2024-03-13 PROCEDURE — 80053 COMPREHEN METABOLIC PANEL: CPT | Performed by: EMERGENCY MEDICINE

## 2024-03-13 PROCEDURE — 93005 ELECTROCARDIOGRAM TRACING: CPT

## 2024-03-13 PROCEDURE — 36415 COLL VENOUS BLD VENIPUNCTURE: CPT | Performed by: EMERGENCY MEDICINE

## 2024-03-13 PROCEDURE — 84484 ASSAY OF TROPONIN QUANT: CPT | Performed by: EMERGENCY MEDICINE

## 2024-03-13 PROCEDURE — 0241U HB NFCT DS VIR RESP RNA 4 TRGT: CPT | Performed by: EMERGENCY MEDICINE

## 2024-03-13 PROCEDURE — 71045 X-RAY EXAM CHEST 1 VIEW: CPT

## 2024-03-13 RX ADMIN — SODIUM CHLORIDE 1000 ML: 0.9 INJECTION, SOLUTION INTRAVENOUS at 17:54

## 2024-03-13 NOTE — ED PROVIDER NOTES
"History  Chief Complaint   Patient presents with    Dizziness     C/o dizziness x3 days and b/l leg and foot numbness x1 week     Patient complains of generalized weakness, trouble ambulating, dizzy and lightheaded, diffuse leg and foot numbness, onset for \"a while\".  When asked how long this is he states several weeks.  He denies chest pain or shortness of breath.  He denies abdominal pain or nausea or vomiting.      History provided by:  Patient   used: No    Dizziness  Quality:  Lightheadedness  Severity:  Moderate  Onset quality:  Gradual  Timing:  Constant  Progression:  Unchanged  Chronicity:  New  Relieved by:  Nothing  Worsened by:  Nothing  Ineffective treatments:  None tried  Associated symptoms: weakness    Associated symptoms: no blood in stool, no chest pain, no diarrhea, no headaches, no hearing loss, no nausea, no palpitations, no shortness of breath, no syncope, no vision changes and no vomiting        Prior to Admission Medications   Prescriptions Last Dose Informant Patient Reported? Taking?   amLODIPine (NORVASC) 5 mg tablet   No No   Sig: Take 1 tablet (5 mg total) by mouth daily   aspirin 81 mg chewable tablet   No No   Sig: Chew 1 tablet (81 mg total) daily   atorvastatin (LIPITOR) 40 mg tablet   No No   Sig: Take 1 tablet (40 mg total) by mouth every evening   clopidogrel (PLAVIX) 75 mg tablet   No No   Sig: Take 1 tablet (75 mg total) by mouth daily   finasteride (PROSCAR) 5 mg tablet   No No   Sig: Take 1 tablet (5 mg total) by mouth daily   hydrOXYzine HCL (ATARAX) 10 mg tablet   No No   Sig: Take 1 tablet (10 mg total) by mouth every 6 (six) hours as needed for itching   hydrocortisone 1 % cream   No No   Sig: Apply topically 2 (two) times a day   levothyroxine 100 mcg tablet   Yes No   Sig: Take by mouth daily Dose unknown   meclizine (ANTIVERT) 25 mg tablet   No No   Sig: Take 1 tablet (25 mg total) by mouth 3 (three) times a day as needed for dizziness   nicotine " (NICODERM CQ) 14 mg/24hr TD 24 hr patch   No No   Sig: Place 1 patch on the skin daily   tamsulosin (FLOMAX) 0.4 mg   No No   Sig: Take 1 capsule (0.4 mg total) by mouth daily with dinner      Facility-Administered Medications: None       Past Medical History:   Diagnosis Date    BPH (benign prostatic hyperplasia)     Disease of thyroid gland     High cholesterol     Hypertension        Past Surgical History:   Procedure Laterality Date    APPENDECTOMY      APPENDECTOMY      HAND TENDON SURGERY  2013    ROTATOR CUFF REPAIR Right        Family History   Problem Relation Age of Onset    Arthritis Mother     Diabetes Father      I have reviewed and agree with the history as documented.    E-Cigarette/Vaping    E-Cigarette Use Never User      E-Cigarette/Vaping Substances    Nicotine No     THC No     CBD No     Flavoring No     Other No     Unknown No      Social History     Tobacco Use    Smoking status: Every Day     Current packs/day: 0.00     Average packs/day: 0.5 packs/day for 10.0 years (5.0 ttl pk-yrs)     Types: Cigarettes     Start date: 2011     Last attempt to quit: 2021     Years since quitting: 3.1    Smokeless tobacco: Never   Vaping Use    Vaping status: Never Used   Substance Use Topics    Alcohol use: Not Currently    Drug use: Yes     Types: Marijuana     Comment: 2-3 times a week       Review of Systems   Constitutional:  Negative for chills and fever.   HENT:  Negative for ear pain, hearing loss, sore throat, trouble swallowing and voice change.    Eyes:  Negative for pain and discharge.   Respiratory:  Negative for cough, shortness of breath and wheezing.    Cardiovascular:  Negative for chest pain, palpitations and syncope.   Gastrointestinal:  Negative for abdominal pain, blood in stool, constipation, diarrhea, nausea and vomiting.   Genitourinary:  Negative for dysuria, flank pain, frequency and hematuria.   Musculoskeletal:  Negative for joint swelling, neck pain and neck stiffness.   Skin:   Negative for rash and wound.   Neurological:  Positive for dizziness and weakness. Negative for seizures, syncope, facial asymmetry and headaches.   Psychiatric/Behavioral:  Negative for hallucinations, self-injury and suicidal ideas.    All other systems reviewed and are negative.      Physical Exam  Physical Exam  Vitals and nursing note reviewed.   Constitutional:       General: He is not in acute distress.     Appearance: He is well-developed.   HENT:      Head: Normocephalic and atraumatic.      Right Ear: External ear normal.      Left Ear: External ear normal.   Eyes:      General: No scleral icterus.        Right eye: No discharge.         Left eye: No discharge.      Extraocular Movements: Extraocular movements intact.      Conjunctiva/sclera: Conjunctivae normal.   Cardiovascular:      Rate and Rhythm: Normal rate and regular rhythm.      Heart sounds: Normal heart sounds. No murmur heard.  Pulmonary:      Effort: Pulmonary effort is normal.      Breath sounds: Normal breath sounds. No wheezing or rales.   Abdominal:      General: Bowel sounds are normal. There is no distension.      Palpations: Abdomen is soft.      Tenderness: There is no abdominal tenderness. There is no guarding or rebound.   Musculoskeletal:         General: No deformity. Normal range of motion.      Cervical back: Normal range of motion and neck supple.   Skin:     General: Skin is warm and dry.      Findings: No rash.   Neurological:      General: No focal deficit present.      Mental Status: He is alert and oriented to person, place, and time.      Cranial Nerves: No cranial nerve deficit.   Psychiatric:         Mood and Affect: Mood normal.         Behavior: Behavior normal.         Thought Content: Thought content normal.         Judgment: Judgment normal.         Vital Signs  ED Triage Vitals [03/13/24 1737]   Temperature Pulse Respirations Blood Pressure SpO2   97.7 °F (36.5 °C) 64 16 134/85 97 %      Temp Source Heart Rate  Source Patient Position - Orthostatic VS BP Location FiO2 (%)   Temporal Monitor Sitting Right arm --      Pain Score       --           Vitals:    03/13/24 1737 03/13/24 1800 03/13/24 1830   BP: 134/85 125/82 123/82   Pulse: 64 59 (!) 54   Patient Position - Orthostatic VS: Sitting Sitting Sitting         Visual Acuity      ED Medications  Medications   sodium chloride 0.9 % bolus 1,000 mL (1,000 mL Intravenous New Bag 3/13/24 1754)       Diagnostic Studies  Results Reviewed       Procedure Component Value Units Date/Time    UA w Reflex to Microscopic w Reflex to Culture [[id number]]  (Abnormal) Collected: 03/13/24 1847    Lab Status: Final result Specimen: Urine, Clean Catch Updated: 03/13/24 1856     Color, UA Yellow     Clarity, UA Clear     Specific Gravity, UA 1.020     pH, UA 6.5     Leukocytes, UA Negative     Nitrite, UA Negative     Protein, UA Negative mg/dl      Glucose, UA Negative mg/dl      Ketones, UA Negative mg/dl      Urobilinogen, UA 2.0 E.U./dl      Bilirubin, UA Negative     Occult Blood, UA Trace-Intact    Urine Microscopic [[id number]] Collected: 03/13/24 1847    Lab Status: In process Specimen: Urine, Clean Catch Updated: 03/13/24 1856    COVID19, Influenza A/B, RSV PCR, [facility] [[id number]]  (Normal) Collected: 03/13/24 1750    Lab Status: Final result Specimen: Nares from Nasopharyngeal Swab Updated: 03/13/24 1835     SARS-CoV-2 Negative     INFLUENZA A PCR Negative     INFLUENZA B PCR Negative     RSV PCR Negative    Narrative:      FOR PEDIATRIC PATIENTS - copy/paste COVID Guidelines URL to browser: [email or web address]    SARS-CoV-2 assay is a Nucleic Acid Amplification assay intended for the  qualitative detection of nucleic acid from SARS-CoV-2 in nasopharyngeal  swabs. Results are for the presumptive identification of SARS-CoV-2 RNA.    Positive results are indicative of infection with SARS-CoV-2, the virus  causing COVID-19, but do  not rule out bacterial infection or co-infection  with other viruses. Laboratories within the [place] and its  territories are required to report all positive results to the appropriate  public health authorities. Negative results do not preclude SARS-CoV-2  infection and should not be used as the sole basis for treatment or other  patient management decisions. Negative results must be combined with  clinical observations, patient history, and epidemiological information.  This test has not been FDA cleared or approved.    This test has been authorized by FDA under an Emergency Use Authorization  (EUA). This test is only authorized for the duration of time the  declaration that circumstances exist justifying the authorization of the  emergency use of an in vitro diagnostic tests for detection of SARS-CoV-2  virus and/or diagnosis of COVID-19 infection under section 564(b)(1) of  the Act, 21 U.S.C. 360bbb-3(b)(1), unless the authorization is terminated  or revoked sooner. The test has been validated but independent review by FDA  and CLIA is pending.    Test performed using [employer] GeneXpert: This RT-PCR assay targets N2,  a region unique to SARS-CoV-2. A conserved region in the E-gene was chosen  for pan-Sarbecovirus detection which includes SARS-CoV-2.    According to CMS-2020-01-R, this platform meets the definition of high-throughput technology.    HS Troponin I 2hr [[id number]]     Lab Status: No result Specimen: Blood     HS Troponin 0hr (reflex protocol) [[id number]]  (Normal) Collected: 03/13/24 1750    Lab Status: Final result Specimen: Blood from Arm, Right Updated: 03/13/24 1826     hs TnI 0hr 13 ng/L     B-Type Natriuretic Peptide(BNP) [[id number]]  (Normal) Collected: 03/13/24 1750    Lab Status: Final result Specimen: Blood from Arm, Right Updated: 03/13/24 1825     BNP 55 pg/mL     Comprehensive metabolic panel [[id number]]  (Abnormal) Collected: 03/13/24 1750    Lab Status: Final result Specimen:  Blood from Arm, Right Updated: 03/13/24 1818     Sodium 139 mmol/L      Potassium 4.2 mmol/L      Chloride 105 mmol/L      CO2 28 mmol/L      ANION GAP 6 mmol/L      BUN 21 mg/dL      Creatinine 1.69 mg/dL      Glucose 78 mg/dL      Calcium 9.4 mg/dL      AST 88 U/L      ALT 66 U/L      Alkaline Phosphatase 46 U/L      Total Protein 7.2 g/dL      Albumin 4.2 g/dL      Total Bilirubin 1.02 mg/dL      eGFR 39 ml/min/1.73sq m     Narrative:      National Kidney Disease Foundation guidelines for Chronic Kidney Disease (CKD):     Stage 1 with normal or high GFR (GFR > 90 mL/min/1.73 square meters)    Stage 2 Mild CKD (GFR = 60-89 mL/min/1.73 square meters)    Stage 3A Moderate CKD (GFR = 45-59 mL/min/1.73 square meters)    Stage 3B Moderate CKD (GFR = 30-44 mL/min/1.73 square meters)    Stage 4 Severe CKD (GFR = 15-29 mL/min/1.73 square meters)    Stage 5 End Stage CKD (GFR <15 mL/min/1.73 square meters)  Note: GFR calculation is accurate only with a steady state creatinine    Lipase [[id number]]  (Normal) Collected: 03/13/24 1750    Lab Status: Final result Specimen: Blood from Arm, Right Updated: 03/13/24 1818     Lipase 35 u/L     Lactic acid, plasma (w/reflex if result > 2.0) [[id number]]  (Normal) Collected: 03/13/24 1750    Lab Status: Final result Specimen: Blood from Arm, Right Updated: 03/13/24 1818     LACTIC ACID 0.8 mmol/L     Narrative:      Result may be elevated if tourniquet was used during collection.    Magnesium [[id number]]  (Normal) Collected: 03/13/24 1750    Lab Status: Final result Specimen: Blood from Arm, Right Updated: 03/13/24 1818     Magnesium 2.1 mg/dL     CBC and differential [[id number]] Collected: 03/13/24 1750    Lab Status: Final result Specimen: Blood from Arm, Right Updated: 03/13/24 1758     WBC 8.76 Thousand/uL      RBC 4.20 Million/uL      Hemoglobin 13.9 g/dL      Hematocrit 40.1 %      MCV 96 fL      MCH 33.1 pg      MCHC 34.7 g/dL      RDW 13.6 %      MPV 9.3 fL      Platelets 221  Thousands/uL      nRBC 0 /100 WBCs      Neutrophils Relative 57 %      Immature Grans % 1 %      Lymphocytes Relative 29 %      Monocytes Relative 10 %      Eosinophils Relative 2 %      Basophils Relative 1 %      Neutrophils Absolute 4.92 Thousands/µL      Absolute Immature Grans 0.09 Thousand/uL      Absolute Lymphocytes 2.55 Thousands/µL      Absolute Monocytes 0.91 Thousand/µL      Eosinophils Absolute 0.19 Thousand/µL      Basophils Absolute 0.10 Thousands/µL     Blood culture #2 [[id number]] Collected: 03/13/24 1750    Lab Status: In process Specimen: Blood from Arm, Right Updated: 03/13/24 1755    Blood culture #1 [[id number]] Collected: 03/13/24 1751    Lab Status: In process Specimen: Blood from Arm, Left Updated: 03/13/24 1754                   XR chest portable   ED Interpretation by Marcos Lou MD (03/13 1821)   No acute finding                 Procedures  ECG 12 Lead Documentation Only    Date/Time: 3/13/2024 5:37 PM    Performed by: Marcos Lou MD  Authorized by: Marcos Lou MD    ECG reviewed by me, the ED Provider: yes    Patient location:  ED  Previous ECG:     Previous ECG:  Unavailable  Interpretation:     Interpretation: non-specific    Rate:     ECG rate:  64    ECG rate assessment: normal    Rhythm:     Rhythm: sinus rhythm    Ectopy:     Ectopy: none    QRS:     QRS axis:  Left    QRS intervals:  Normal  Conduction:     Conduction: abnormal      Abnormal conduction: 1st degree    ST segments:     ST segments:  Normal  T waves:     T waves: normal             ED Course                               SBIRT 22yo+      Flowsheet Row Most Recent Value   Initial Alcohol Screen: US AUDIT-C     1. How often do you have a drink containing alcohol? 0 Filed at: 03/13/2024 1742   2. How many drinks containing alcohol do you have on a typical day you are drinking?  0 Filed at: 03/13/2024 1742   3b. FEMALE Any Age, or MALE 65+: How often do you have 4 or more drinks on one occassion? 0 Filed  "at: 03/13/2024 1742   Audit-C Score 0 Filed at: 03/13/2024 1742   JIMMY: How many times in the past year have you...    Used an illegal drug or used a prescription medication for non-medical reasons? Daily or Almost Daily  [\"as often as I can get it\" (aleks)] Filed at: 03/13/2024 1742   DAST-10: In the past 12 months...    1. Have you used drugs other than those required for medical reasons? 1 Filed at: 03/13/2024 1742   2. Do you use more than one drug at a time? 0 Filed at: 03/13/2024 1742   3. Have you had medical problems as a result of your drug use (e.g., memory loss, hepatitis, convulsions, bleeding, etc.)? 0 Filed at: 03/13/2024 1742   4. Have you had \"blackouts\" or \"flashbacks\" as a result of drug use?YesNo 0 Filed at: 03/13/2024 1742   5. Do you ever feel bad or guilty about your drug use? 0 Filed at: 03/13/2024 1742   6. Does your spouse (or parent) ever complain about your involvement with drugs? 0 Filed at: 03/13/2024 1742   7. Have you neglected your family because of your use of drugs? 0 Filed at: 03/13/2024 1742   8. Have you engaged in illegal activities in order to obtain drugs? 0 Filed at: 03/13/2024 1742   9. Have you ever experienced withdrawal symptoms (felt sick) when you stopped taking drugs? 0 Filed at: 03/13/2024 1742   10. Are you always able to stop using drugs when you want to? 0 Filed at: 03/13/2024 1742   DAST-10 Score 1 Filed at: 03/13/2024 1742                      Medical Decision Making  Based on the history and medical screening exam performed the diagnostic considerations include but are not limited to flu/COVID/RSV, pneumonia, dehydration, electrolyte abnormality, viral illness, anemia, deconditioning.    Based on the work-up performed in the emergency room which includes physical examination, and which may include laboratory studies and imaging as warranted including advanced imaging such as CT scan or ultrasound, the diagnostic considerations are narrowed to exclude limb " or life-threatening process.    The patient is stable for discharge.  Workup in ED negative including lab work and EKG and chest x-ray.  Patient is able to ambulate without difficulty.  COVID testing is negative.  Urinalysis is negative.  Patient does have mildly elevated creatinine to 1.69, last known creatinine 1.4 from 1 year ago.  I did discuss this finding with the patient and advised follow-up with primary care physician for recheck.  Patient is agreeable.    Amount and/or Complexity of Data Reviewed  Labs: ordered. Decision-making details documented in ED Course.     Details: Creatinine 1.69, otherwise normal including CBC, CMP, troponin, COVID testing, urinalysis  Radiology: ordered and independent interpretation performed. Decision-making details documented in ED Course.     Details: Chest x-ray negative  ECG/medicine tests: ordered and independent interpretation performed. Decision-making details documented in ED Course.     Details: Sinus rhythm, rate 64, first-degree block, left axis deviation             Disposition  Final diagnoses:   Weakness     Time reflects when diagnosis was documented in both MDM as applicable and the Disposition within this note       Time User Action Codes Description Comment    3/13/2024  7:01 PM Marcos Lou Add [R53.1] Weakness           ED Disposition       ED Disposition   Discharge    Condition   Stable    Date/Time   Wed Mar 13, 2024 1901    Comment   Art Luis discharge to home/self care.                   Follow-up Information       Follow up With Specialties Details Why Contact Info    Benita Peter DO Family Medicine In 2 days  [address] [facility] [address] [phone]              Patient's Medications   Discharge Prescriptions    No medications on file       No discharge procedures on file.    PDMP Review       None            ED Provider  Electronically Signed by             Marcos Lou MD  03/13/24 1319

## 2024-03-13 NOTE — DISCHARGE INSTRUCTIONS
Today the creatinine on the blood work was 1.69.  This is slightly elevated from your last known creatinine from 1 year ago which was 1.4.  I recommend you follow-up with your primary care physician for recheck in the next several days.

## 2024-03-14 LAB
ATRIAL RATE: 64 BPM
P AXIS: 29 DEGREES
PR INTERVAL: 232 MS
QRS AXIS: -44 DEGREES
QRSD INTERVAL: 102 MS
QT INTERVAL: 406 MS
QTC INTERVAL: 418 MS
T WAVE AXIS: 51 DEGREES
VENTRICULAR RATE: 64 BPM

## 2024-03-14 PROCEDURE — 93010 ELECTROCARDIOGRAM REPORT: CPT | Performed by: INTERNAL MEDICINE

## 2024-03-18 LAB
BACTERIA BLD CULT: NORMAL
BACTERIA BLD CULT: NORMAL

## 2024-03-19 LAB
BACTERIA BLD CULT: NORMAL
BACTERIA BLD CULT: NORMAL

## 2024-07-20 ENCOUNTER — HOSPITAL ENCOUNTER (OUTPATIENT)
Dept: ULTRASOUND IMAGING | Facility: HOSPITAL | Age: 74
Discharge: HOME | End: 2024-07-20
Payer: COMMERCIAL

## 2024-07-20 DIAGNOSIS — Z13.6 ENCOUNTER FOR SCREENING FOR CARDIOVASCULAR DISORDERS: ICD-10-CM

## 2024-07-20 PROCEDURE — 76775 US EXAM ABDO BACK WALL LIM: CPT

## 2024-12-16 PROBLEM — Z12.5 SCREENING FOR PROSTATE CANCER: Status: RESOLVED | Noted: 2021-08-09 | Resolved: 2024-12-16

## 2025-07-28 ENCOUNTER — HOSPITAL ENCOUNTER (EMERGENCY)
Facility: HOSPITAL | Age: 75
Discharge: HOME | End: 2025-07-28
Attending: EMERGENCY MEDICINE | Admitting: EMERGENCY MEDICINE
Payer: COMMERCIAL

## 2025-07-28 ENCOUNTER — APPOINTMENT (EMERGENCY)
Dept: CT IMAGING | Facility: HOSPITAL | Age: 75
End: 2025-07-28
Payer: COMMERCIAL

## 2025-07-28 VITALS
TEMPERATURE: 98.2 F | HEART RATE: 64 BPM | OXYGEN SATURATION: 97 % | RESPIRATION RATE: 12 BRPM | DIASTOLIC BLOOD PRESSURE: 87 MMHG | SYSTOLIC BLOOD PRESSURE: 134 MMHG

## 2025-07-28 DIAGNOSIS — M54.50 LOW BACK PAIN: Primary | ICD-10-CM

## 2025-07-28 DIAGNOSIS — M89.9 BONE LESION: ICD-10-CM

## 2025-07-28 LAB
ALBUMIN SERPL BCG-MCNC: 4.2 G/DL (ref 3.5–5)
ALP SERPL-CCNC: 210 U/L (ref 34–104)
ALT SERPL W P-5'-P-CCNC: 12 U/L (ref 7–52)
ANION GAP SERPL CALCULATED.3IONS-SCNC: 6 MMOL/L (ref 4–13)
APTT PPP: 35 SECONDS (ref 23–34)
AST SERPL W P-5'-P-CCNC: 19 U/L (ref 13–39)
BACTERIA UR QL AUTO: NORMAL /HPF
BASOPHILS # BLD AUTO: 0.06 THOUSANDS/ÂΜL (ref 0–0.1)
BASOPHILS NFR BLD AUTO: 1 % (ref 0–1)
BILIRUB SERPL-MCNC: 1.13 MG/DL (ref 0.2–1)
BILIRUB UR QL STRIP: NEGATIVE
BUN SERPL-MCNC: 24 MG/DL (ref 5–25)
CALCIUM SERPL-MCNC: 8.9 MG/DL (ref 8.4–10.2)
CHLORIDE SERPL-SCNC: 103 MMOL/L (ref 96–108)
CLARITY UR: CLEAR
CO2 SERPL-SCNC: 28 MMOL/L (ref 21–32)
COLOR UR: YELLOW
CREAT SERPL-MCNC: 1.45 MG/DL (ref 0.6–1.3)
EOSINOPHIL # BLD AUTO: 0.11 THOUSAND/ÂΜL (ref 0–0.61)
EOSINOPHIL NFR BLD AUTO: 2 % (ref 0–6)
ERYTHROCYTE [DISTWIDTH] IN BLOOD BY AUTOMATED COUNT: 13.5 % (ref 11.6–15.1)
GFR SERPL CREATININE-BSD FRML MDRD: 46 ML/MIN/1.73SQ M
GLUCOSE SERPL-MCNC: 93 MG/DL (ref 65–140)
GLUCOSE UR STRIP-MCNC: NEGATIVE MG/DL
HCT VFR BLD AUTO: 36.6 % (ref 36.5–49.3)
HGB BLD-MCNC: 12.1 G/DL (ref 12–17)
HGB UR QL STRIP.AUTO: ABNORMAL
IMM GRANULOCYTES # BLD AUTO: 0.04 THOUSAND/UL (ref 0–0.2)
IMM GRANULOCYTES NFR BLD AUTO: 1 % (ref 0–2)
INR PPP: 1.09 (ref 0.85–1.19)
KETONES UR STRIP-MCNC: NEGATIVE MG/DL
LEUKOCYTE ESTERASE UR QL STRIP: NEGATIVE
LYMPHOCYTES # BLD AUTO: 1.43 THOUSANDS/ÂΜL (ref 0.6–4.47)
LYMPHOCYTES NFR BLD AUTO: 20 % (ref 14–44)
MCH RBC QN AUTO: 32.6 PG (ref 26.8–34.3)
MCHC RBC AUTO-ENTMCNC: 33.1 G/DL (ref 31.4–37.4)
MCV RBC AUTO: 99 FL (ref 82–98)
MONOCYTES # BLD AUTO: 0.87 THOUSAND/ÂΜL (ref 0.17–1.22)
MONOCYTES NFR BLD AUTO: 12 % (ref 4–12)
NEUTROPHILS # BLD AUTO: 4.57 THOUSANDS/ÂΜL (ref 1.85–7.62)
NEUTS SEG NFR BLD AUTO: 64 % (ref 43–75)
NITRITE UR QL STRIP: NEGATIVE
NON-SQ EPI CELLS URNS QL MICRO: NORMAL /HPF
NRBC BLD AUTO-RTO: 0 /100 WBCS
PH UR STRIP.AUTO: 6 [PH]
PLATELET # BLD AUTO: 231 THOUSANDS/UL (ref 149–390)
PMV BLD AUTO: 8.9 FL (ref 8.9–12.7)
POTASSIUM SERPL-SCNC: 4.2 MMOL/L (ref 3.5–5.3)
PROT SERPL-MCNC: 7.4 G/DL (ref 6.4–8.4)
PROT UR STRIP-MCNC: NEGATIVE MG/DL
PROTHROMBIN TIME: 14.7 SECONDS (ref 12.3–15)
RBC # BLD AUTO: 3.71 MILLION/UL (ref 3.88–5.62)
RBC #/AREA URNS AUTO: NORMAL /HPF
SODIUM SERPL-SCNC: 137 MMOL/L (ref 135–147)
SP GR UR STRIP.AUTO: 1.01 (ref 1–1.03)
UROBILINOGEN UR QL STRIP.AUTO: 0.2 E.U./DL
WBC # BLD AUTO: 7.08 THOUSAND/UL (ref 4.31–10.16)
WBC #/AREA URNS AUTO: NORMAL /HPF

## 2025-07-28 PROCEDURE — 85610 PROTHROMBIN TIME: CPT | Performed by: EMERGENCY MEDICINE

## 2025-07-28 PROCEDURE — 80053 COMPREHEN METABOLIC PANEL: CPT | Performed by: EMERGENCY MEDICINE

## 2025-07-28 PROCEDURE — 99284 EMERGENCY DEPT VISIT MOD MDM: CPT

## 2025-07-28 PROCEDURE — 85730 THROMBOPLASTIN TIME PARTIAL: CPT | Performed by: EMERGENCY MEDICINE

## 2025-07-28 PROCEDURE — 99285 EMERGENCY DEPT VISIT HI MDM: CPT | Performed by: EMERGENCY MEDICINE

## 2025-07-28 PROCEDURE — 74174 CTA ABD&PLVS W/CONTRAST: CPT

## 2025-07-28 PROCEDURE — 36415 COLL VENOUS BLD VENIPUNCTURE: CPT | Performed by: EMERGENCY MEDICINE

## 2025-07-28 PROCEDURE — 71275 CT ANGIOGRAPHY CHEST: CPT

## 2025-07-28 PROCEDURE — 81001 URINALYSIS AUTO W/SCOPE: CPT | Performed by: EMERGENCY MEDICINE

## 2025-07-28 PROCEDURE — 85025 COMPLETE CBC W/AUTO DIFF WBC: CPT | Performed by: EMERGENCY MEDICINE

## 2025-07-28 PROCEDURE — 96365 THER/PROPH/DIAG IV INF INIT: CPT

## 2025-07-28 RX ORDER — ACETAMINOPHEN 10 MG/ML
1000 INJECTION, SOLUTION INTRAVENOUS ONCE
Status: COMPLETED | OUTPATIENT
Start: 2025-07-28 | End: 2025-07-28

## 2025-07-28 RX ORDER — MORPHINE SULFATE 15 MG/1
15 TABLET ORAL ONCE
Refills: 0 | Status: COMPLETED | OUTPATIENT
Start: 2025-07-28 | End: 2025-07-28

## 2025-07-28 RX ORDER — MORPHINE SULFATE 15 MG/1
7.5-15 TABLET ORAL EVERY 8 HOURS PRN
Qty: 6 TABLET | Refills: 0 | Status: SHIPPED | OUTPATIENT
Start: 2025-07-28

## 2025-07-28 RX ADMIN — ACETAMINOPHEN 1000 MG: 10 INJECTION INTRAVENOUS at 09:21

## 2025-07-28 RX ADMIN — IOHEXOL 100 ML: 350 INJECTION, SOLUTION INTRAVENOUS at 09:57

## 2025-07-28 RX ADMIN — SODIUM CHLORIDE 500 ML: 0.9 INJECTION, SOLUTION INTRAVENOUS at 09:20

## 2025-07-28 RX ADMIN — MORPHINE SULFATE 15 MG: 15 TABLET ORAL at 11:24

## 2025-07-31 ENCOUNTER — TELEPHONE (OUTPATIENT)
Age: 75
End: 2025-07-31

## 2025-08-04 PROBLEM — R97.20 ELEVATED PSA: Status: ACTIVE | Noted: 2025-08-04

## 2025-08-05 ENCOUNTER — OFFICE VISIT (OUTPATIENT)
Dept: UROLOGY | Facility: CLINIC | Age: 75
End: 2025-08-05
Payer: COMMERCIAL

## 2025-08-05 VITALS
TEMPERATURE: 97.7 F | BODY MASS INDEX: 24 KG/M2 | OXYGEN SATURATION: 98 % | SYSTOLIC BLOOD PRESSURE: 118 MMHG | DIASTOLIC BLOOD PRESSURE: 64 MMHG | HEART RATE: 76 BPM | WEIGHT: 187 LBS | HEIGHT: 74 IN

## 2025-08-05 DIAGNOSIS — C61 PROSTATE CANCER (HCC): ICD-10-CM

## 2025-08-05 DIAGNOSIS — N39.41 BENIGN PROSTATIC HYPERPLASIA (BPH) WITH URINARY URGE INCONTINENCE: Primary | ICD-10-CM

## 2025-08-05 DIAGNOSIS — R97.20 ELEVATED PSA: ICD-10-CM

## 2025-08-05 DIAGNOSIS — N40.1 BENIGN PROSTATIC HYPERPLASIA (BPH) WITH URINARY URGE INCONTINENCE: Primary | ICD-10-CM

## 2025-08-05 DIAGNOSIS — N18.31 CHRONIC KIDNEY DISEASE, STAGE 3A (HCC): ICD-10-CM

## 2025-08-05 LAB
SL AMB  POCT GLUCOSE, UA: ABNORMAL
SL AMB LEUKOCYTE ESTERASE,UA: ABNORMAL
SL AMB POCT BILIRUBIN,UA: ABNORMAL
SL AMB POCT BLOOD,UA: ABNORMAL
SL AMB POCT CLARITY,UA: CLEAR
SL AMB POCT COLOR,UA: ABNORMAL
SL AMB POCT KETONES,UA: ABNORMAL
SL AMB POCT NITRITE,UA: ABNORMAL
SL AMB POCT PH,UA: 5.5
SL AMB POCT SPECIFIC GRAVITY,UA: 1.02
SL AMB POCT URINE PROTEIN: ABNORMAL
SL AMB POCT UROBILINOGEN: 2

## 2025-08-05 PROCEDURE — 81003 URINALYSIS AUTO W/O SCOPE: CPT | Performed by: UROLOGY

## 2025-08-05 PROCEDURE — 99204 OFFICE O/P NEW MOD 45 MIN: CPT | Performed by: UROLOGY

## 2025-08-19 ENCOUNTER — TELEPHONE (OUTPATIENT)
Dept: UROLOGY | Facility: CLINIC | Age: 75
End: 2025-08-19